# Patient Record
Sex: FEMALE | Race: WHITE | Employment: FULL TIME | URBAN - METROPOLITAN AREA
[De-identification: names, ages, dates, MRNs, and addresses within clinical notes are randomized per-mention and may not be internally consistent; named-entity substitution may affect disease eponyms.]

---

## 2018-02-16 ENCOUNTER — HOSPITAL ENCOUNTER (INPATIENT)
Facility: HOSPITAL | Age: 58
LOS: 1 days | Discharge: HOME | DRG: 310 | End: 2018-02-17
Attending: EMERGENCY MEDICINE | Admitting: HOSPITALIST
Payer: COMMERCIAL

## 2018-02-16 DIAGNOSIS — I48.91 ATRIAL FIBRILLATION AND FLUTTER (HCC): Primary | ICD-10-CM

## 2018-02-16 DIAGNOSIS — I48.92 ATRIAL FIBRILLATION AND FLUTTER (HCC): Primary | ICD-10-CM

## 2018-02-16 PROBLEM — E87.6 HYPOKALEMIA: Status: ACTIVE | Noted: 2018-02-16

## 2018-02-16 PROBLEM — R79.89 AZOTEMIA: Status: ACTIVE | Noted: 2018-02-16

## 2018-02-16 PROBLEM — R73.9 HYPERGLYCEMIA: Status: ACTIVE | Noted: 2018-02-16

## 2018-02-16 LAB
ANION GAP SERPL CALC-SCNC: 8 MMOL/L (ref 0–18)
APTT PPP: 24.7 SECONDS (ref 23.2–35.3)
BASOPHILS # BLD: 0.1 K/UL (ref 0–0.2)
BASOPHILS NFR BLD: 1 %
BILIRUB UR QL: NEGATIVE
BUN SERPL-MCNC: 15 MG/DL (ref 8–20)
BUN/CREAT SERPL: 20.5 (ref 10–20)
CALCIUM SERPL-MCNC: 9.2 MG/DL (ref 8.5–10.5)
CHLORIDE SERPL-SCNC: 103 MMOL/L (ref 95–110)
CO2 SERPL-SCNC: 27 MMOL/L (ref 22–32)
COLOR UR: YELLOW
CREAT SERPL-MCNC: 0.73 MG/DL (ref 0.5–1.5)
EOSINOPHIL # BLD: 0.2 K/UL (ref 0–0.7)
EOSINOPHIL NFR BLD: 3 %
ERYTHROCYTE [DISTWIDTH] IN BLOOD BY AUTOMATED COUNT: 14.4 % (ref 11–15)
GLUCOSE SERPL-MCNC: 111 MG/DL (ref 70–99)
GLUCOSE UR-MCNC: NEGATIVE MG/DL
HCT VFR BLD AUTO: 44.2 % (ref 35–48)
HGB BLD-MCNC: 14.9 G/DL (ref 12–16)
INR BLD: 0.9 (ref 0.9–1.2)
KETONES UR-MCNC: NEGATIVE MG/DL
LYMPHOCYTES # BLD: 1.7 K/UL (ref 1–4)
LYMPHOCYTES NFR BLD: 33 %
MAGNESIUM SERPL-MCNC: 1.9 MG/DL (ref 1.8–2.5)
MCH RBC QN AUTO: 30.2 PG (ref 27–32)
MCHC RBC AUTO-ENTMCNC: 33.8 G/DL (ref 32–37)
MCV RBC AUTO: 89.5 FL (ref 80–100)
MONOCYTES # BLD: 0.4 K/UL (ref 0–1)
MONOCYTES NFR BLD: 7 %
NEUTROPHILS # BLD AUTO: 3 K/UL (ref 1.8–7.7)
NEUTROPHILS NFR BLD: 56 %
NITRITE UR QL STRIP.AUTO: NEGATIVE
OSMOLALITY UR CALC.SUM OF ELEC: 288 MOSM/KG (ref 275–295)
PH UR: 5 [PH] (ref 5–8)
PLATELET # BLD AUTO: 250 K/UL (ref 140–400)
PMV BLD AUTO: 7.8 FL (ref 7.4–10.3)
POTASSIUM SERPL-SCNC: 3.1 MMOL/L (ref 3.3–5.1)
PROT UR-MCNC: NEGATIVE MG/DL
PROTHROMBIN TIME: 12.2 SECONDS (ref 11.8–14.5)
RBC # BLD AUTO: 4.94 M/UL (ref 3.7–5.4)
RBC #/AREA URNS AUTO: 7 /HPF
SODIUM SERPL-SCNC: 138 MMOL/L (ref 136–144)
SP GR UR STRIP: 1.02 (ref 1–1.03)
TROPONIN I SERPL-MCNC: 0 NG/ML (ref ?–0.03)
TSH SERPL-ACNC: 2.91 UIU/ML (ref 0.45–5.33)
UROBILINOGEN UR STRIP-ACNC: <2
VIT C UR-MCNC: NEGATIVE MG/DL
WBC # BLD AUTO: 5.3 K/UL (ref 4–11)
WBC #/AREA URNS AUTO: 32 /HPF

## 2018-02-16 PROCEDURE — 99222 1ST HOSP IP/OBS MODERATE 55: CPT | Performed by: HOSPITALIST

## 2018-02-16 RX ORDER — ACETAMINOPHEN / DIPHENHYDRAMINE 25; 500 MG/1; MG/1
1 TABLET ORAL NIGHTLY PRN
Status: ON HOLD | COMMUNITY
End: 2019-01-21

## 2018-02-16 RX ORDER — CIPROFLOXACIN 500 MG/1
500 TABLET, FILM COATED ORAL ONCE
Status: COMPLETED | OUTPATIENT
Start: 2018-02-16 | End: 2018-02-16

## 2018-02-16 RX ORDER — ACETAMINOPHEN / DIPHENHYDRAMINE 25; 500 MG/1; MG/1
1 TABLET ORAL NIGHTLY PRN
Status: DISCONTINUED | OUTPATIENT
Start: 2018-02-16 | End: 2018-02-16 | Stop reason: RX

## 2018-02-16 RX ORDER — LEVOFLOXACIN 5 MG/ML
750 INJECTION, SOLUTION INTRAVENOUS ONCE
Status: DISCONTINUED | OUTPATIENT
Start: 2018-02-16 | End: 2018-02-16

## 2018-02-16 RX ORDER — DILTIAZEM HYDROCHLORIDE 5 MG/ML
10 INJECTION INTRAVENOUS
Status: DISCONTINUED | OUTPATIENT
Start: 2018-02-16 | End: 2018-02-17

## 2018-02-16 RX ORDER — ASPIRIN 81 MG/1
81 TABLET ORAL NIGHTLY
Status: DISCONTINUED | OUTPATIENT
Start: 2018-02-16 | End: 2018-02-17

## 2018-02-16 RX ORDER — DIPHENHYDRAMINE HCL 25 MG
25 CAPSULE ORAL NIGHTLY PRN
Status: DISCONTINUED | OUTPATIENT
Start: 2018-02-16 | End: 2018-02-17

## 2018-02-16 RX ORDER — CIPROFLOXACIN 500 MG/1
750 TABLET, FILM COATED ORAL EVERY 12 HOURS SCHEDULED
Status: DISCONTINUED | OUTPATIENT
Start: 2018-02-17 | End: 2018-02-17

## 2018-02-16 RX ORDER — POTASSIUM CHLORIDE 20 MEQ/1
40 TABLET, EXTENDED RELEASE ORAL ONCE
Status: COMPLETED | OUTPATIENT
Start: 2018-02-16 | End: 2018-02-16

## 2018-02-16 RX ORDER — ZOLPIDEM TARTRATE 5 MG/1
5 TABLET ORAL NIGHTLY PRN
Status: DISCONTINUED | OUTPATIENT
Start: 2018-02-16 | End: 2018-02-16

## 2018-02-16 RX ORDER — ACETAMINOPHEN 500 MG
500 TABLET ORAL NIGHTLY PRN
Status: DISCONTINUED | OUTPATIENT
Start: 2018-02-16 | End: 2018-02-17

## 2018-02-16 RX ORDER — ASPIRIN 81 MG/1
81 TABLET ORAL NIGHTLY
Status: ON HOLD | COMMUNITY
End: 2019-01-21

## 2018-02-16 RX ORDER — DILTIAZEM HYDROCHLORIDE 60 MG/1
60 TABLET, FILM COATED ORAL AS NEEDED
Status: DISCONTINUED | OUTPATIENT
Start: 2018-02-16 | End: 2018-02-17

## 2018-02-16 RX ORDER — SODIUM CHLORIDE 0.9 % (FLUSH) 0.9 %
3 SYRINGE (ML) INJECTION AS NEEDED
Status: DISCONTINUED | OUTPATIENT
Start: 2018-02-16 | End: 2018-02-17

## 2018-02-16 RX ORDER — DILTIAZEM HYDROCHLORIDE 5 MG/ML
10 INJECTION INTRAVENOUS
Status: DISCONTINUED | OUTPATIENT
Start: 2018-02-16 | End: 2018-02-16 | Stop reason: ALTCHOICE

## 2018-02-16 RX ORDER — ONDANSETRON 2 MG/ML
4 INJECTION INTRAMUSCULAR; INTRAVENOUS EVERY 6 HOURS PRN
Status: DISCONTINUED | OUTPATIENT
Start: 2018-02-16 | End: 2018-02-17

## 2018-02-16 NOTE — ED NOTES
Care assumed from EMS. Patient presents with c/o \"fluttering\" feeling in her chest. Pt states she was sitting at work when she checked her heart rate via her watch and noticed it was \"going all over the place\". EMS reporting afib via EKG en route.  Ayanna

## 2018-02-16 NOTE — CONSULTS
[facility]    Report of Consultation    Kenroy Ling Patient Status:  Emergency    1960 MRN [id number]   Location [address] Attending Quinten Chambers MD   Hosp Day # 0 PCP No primary care provider on rates and was started on IV Cardizem. Patient transiently converted to sinus rhythm but is still in A. fib flutter. Patient states she is under a lot of stress.   She states she had meningitis almost 9 years ago and was treated and was given heparin for w pressure (!) 165/81, pulse 119, temperature 98.3 °F (36.8 °C), resp. rate 22, height 5' 5\" (1.651 m), weight 215 lb (97.5 kg), SpO2 96 %.   Intake/Output:           Last 24 hours: No intake or output data in the 24 hours ending 02/16/18 2482   This shift:

## 2018-02-16 NOTE — ED INITIAL ASSESSMENT (HPI)
Care assumed from EMS. Patient presents with palpitations, EMS reports new onset Afib. Denies chest pain, denies shortness of breath. Patient reports \"fluttering feeling\".

## 2018-02-16 NOTE — ED PROVIDER NOTES
Patient Seen in: [facility] AND [facility] Emergency Department    History   Patient presents with:  Arrythmia/Palpitations (cardiovascular)    Stated Complaint:     HPI    Patient presents emergency department complaining of fluttering in her chest.  She states Neurological: She is alert and oriented to person, place, and time. Skin: Skin is warm and dry. No rash noted. Nursing note and vitals reviewed.       116 bpm    ED Course     Labs Reviewed   BASIC METABOLIC PANEL (8) - Abnormal; Notable for the follo discussed with patient including need for follow up      Admission disposition: 2/16/2018  3:55 PM           Disposition and Plan     Clinical Impression:  Atrial fibrillation and flutter ([facility]  (primary encounter diagnosis)    Disposition:  Admit  2/16/20

## 2018-02-17 ENCOUNTER — APPOINTMENT (OUTPATIENT)
Dept: CV DIAGNOSTICS | Facility: HOSPITAL | Age: 58
DRG: 310 | End: 2018-02-17
Attending: INTERNAL MEDICINE
Payer: COMMERCIAL

## 2018-02-17 VITALS
OXYGEN SATURATION: 98 % | HEART RATE: 80 BPM | TEMPERATURE: 98 F | SYSTOLIC BLOOD PRESSURE: 133 MMHG | RESPIRATION RATE: 18 BRPM | WEIGHT: 231.19 LBS | HEIGHT: 65 IN | BODY MASS INDEX: 38.52 KG/M2 | DIASTOLIC BLOOD PRESSURE: 71 MMHG

## 2018-02-17 LAB
ANION GAP SERPL CALC-SCNC: 8 MMOL/L (ref 0–18)
BUN SERPL-MCNC: 11 MG/DL (ref 8–20)
BUN/CREAT SERPL: 15.5 (ref 10–20)
CALCIUM SERPL-MCNC: 9.4 MG/DL (ref 8.5–10.5)
CHLORIDE SERPL-SCNC: 104 MMOL/L (ref 95–110)
CO2 SERPL-SCNC: 28 MMOL/L (ref 22–32)
CREAT SERPL-MCNC: 0.71 MG/DL (ref 0.5–1.5)
GLUCOSE SERPL-MCNC: 110 MG/DL (ref 70–99)
MAGNESIUM SERPL-MCNC: 1.9 MG/DL (ref 1.8–2.5)
OSMOLALITY UR CALC.SUM OF ELEC: 290 MOSM/KG (ref 275–295)
POTASSIUM SERPL-SCNC: 4.1 MMOL/L (ref 3.3–5.1)
SODIUM SERPL-SCNC: 140 MMOL/L (ref 136–144)

## 2018-02-17 PROCEDURE — 99239 HOSP IP/OBS DSCHRG MGMT >30: CPT | Performed by: HOSPITALIST

## 2018-02-17 PROCEDURE — 93306 TTE W/DOPPLER COMPLETE: CPT | Performed by: INTERNAL MEDICINE

## 2018-02-17 RX ORDER — CIPROFLOXACIN 750 MG/1
750 TABLET, FILM COATED ORAL EVERY 12 HOURS SCHEDULED
Qty: 4 TABLET | Refills: 0 | Status: SHIPPED | OUTPATIENT
Start: 2018-02-17 | End: 2018-02-19

## 2018-02-17 NOTE — PLAN OF CARE
Problem: Patient/Family Goals  Goal: Patient/Family Short Term Goal  Patient's Short Term Goal: maintain regular heart rhythm    Interventions:   - diltiazem drip  - See additional Care Plan goals for specific interventions   Outcome: Progressing      Prob

## 2018-02-17 NOTE — PLAN OF CARE
Ok to discharge patient home. Discharge instructions explained to patient. Tele and IV discontinued. Per Lily PINEDA patient will follow up on Monday to have 30 day event monitor and they will make appointment for follow up.  Prescription for event monitor give

## 2018-02-17 NOTE — PROGRESS NOTES
[facility]    Progress Note    Davonte Krishnamurthy Patient Status:  Inpatient    1960 MRN [id number]   Location [facility] 3W/SW Attending Victorino Caicedo MD   Hosp Day # 1 PCP Yahir High MD       Subjective:   Boby Maldonado    Hypokalemia  -Correct potassium and check magnesium and TSH     Snoring  -Should have outpatient sleep study     Hypertension in the emergency room  -diltiazem      PLAN    echo normal per Dr Henrique Flores home per cardiology. 30 day event monitor.

## 2018-02-19 ENCOUNTER — MYAURORA ACCOUNT LINK (OUTPATIENT)
Dept: OTHER | Age: 58
End: 2018-02-19

## 2018-03-07 ENCOUNTER — PRIOR ORIGINAL RECORDS (OUTPATIENT)
Dept: OTHER | Age: 58
End: 2018-03-07

## 2018-03-08 ENCOUNTER — APPOINTMENT (OUTPATIENT)
Dept: GENERAL RADIOLOGY | Facility: HOSPITAL | Age: 58
End: 2018-03-08
Attending: PHYSICIAN ASSISTANT
Payer: COMMERCIAL

## 2018-03-08 ENCOUNTER — OFFICE VISIT (OUTPATIENT)
Dept: CARDIOLOGY CLINIC | Facility: CLINIC | Age: 58
End: 2018-03-08

## 2018-03-08 ENCOUNTER — HOSPITAL ENCOUNTER (EMERGENCY)
Facility: HOSPITAL | Age: 58
Discharge: HOME | End: 2018-03-08
Payer: COMMERCIAL

## 2018-03-08 ENCOUNTER — APPOINTMENT (OUTPATIENT)
Dept: LAB | Age: 58
End: 2018-03-08
Attending: INTERNAL MEDICINE
Payer: COMMERCIAL

## 2018-03-08 VITALS
DIASTOLIC BLOOD PRESSURE: 69 MMHG | TEMPERATURE: 98 F | OXYGEN SATURATION: 96 % | SYSTOLIC BLOOD PRESSURE: 124 MMHG | HEART RATE: 76 BPM | RESPIRATION RATE: 18 BRPM

## 2018-03-08 VITALS
SYSTOLIC BLOOD PRESSURE: 118 MMHG | WEIGHT: 230 LBS | DIASTOLIC BLOOD PRESSURE: 70 MMHG | HEART RATE: 64 BPM | RESPIRATION RATE: 12 BRPM | BODY MASS INDEX: 38 KG/M2

## 2018-03-08 DIAGNOSIS — E87.6 HYPOKALEMIA: ICD-10-CM

## 2018-03-08 DIAGNOSIS — I48.91 ATRIAL FIBRILLATION AND FLUTTER (HCC): Primary | ICD-10-CM

## 2018-03-08 DIAGNOSIS — S16.1XXA STRAIN OF NECK MUSCLE, INITIAL ENCOUNTER: ICD-10-CM

## 2018-03-08 DIAGNOSIS — I48.92 ATRIAL FIBRILLATION AND FLUTTER (HCC): ICD-10-CM

## 2018-03-08 DIAGNOSIS — I48.91 ATRIAL FIBRILLATION AND FLUTTER (HCC): ICD-10-CM
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
## 2018-03-08 DIAGNOSIS — I48.92 ATRIAL FIBRILLATION AND FLUTTER (HCC): Primary | ICD-10-CM

## 2018-03-08 DIAGNOSIS — S29.011A CHEST WALL MUSCLE STRAIN, INITIAL ENCOUNTER: ICD-10-CM

## 2018-03-08 LAB
ANION GAP SERPL CALC-SCNC: 8 MMOL/L (ref 0–18)
BUN SERPL-MCNC: 13 MG/DL (ref 8–20)
BUN/CREAT SERPL: 19.7 (ref 10–20)
CALCIUM SERPL-MCNC: 9.3 MG/DL (ref 8.5–10.5)
CHLORIDE SERPL-SCNC: 102 MMOL/L (ref 95–110)
CO2 SERPL-SCNC: 28 MMOL/L (ref 22–32)
CREAT SERPL-MCNC: 0.66 MG/DL (ref 0.5–1.5)
GLUCOSE SERPL-MCNC: 96 MG/DL (ref 70–99)
OSMOLALITY UR CALC.SUM OF ELEC: 286 MOSM/KG (ref 275–295)
POTASSIUM SERPL-SCNC: 3.8 MMOL/L (ref 3.3–5.1)
SODIUM SERPL-SCNC: 138 MMOL/L (ref 136–144)

## 2018-03-08 PROCEDURE — 1111F DSCHRG MED/CURRENT MED MERGE: CPT | Performed by: INTERNAL MEDICINE

## 2018-03-08 PROCEDURE — 72040 X-RAY EXAM NECK SPINE 2-3 VW: CPT | Performed by: PHYSICIAN ASSISTANT

## 2018-03-08 PROCEDURE — 73030 X-RAY EXAM OF SHOULDER: CPT | Performed by: PHYSICIAN ASSISTANT

## 2018-03-08 PROCEDURE — 99284 EMERGENCY DEPT VISIT MOD MDM: CPT

## 2018-03-08 PROCEDURE — 71101 X-RAY EXAM UNILAT RIBS/CHEST: CPT | Performed by: PHYSICIAN ASSISTANT

## 2018-03-08 PROCEDURE — 80048 BASIC METABOLIC PNL TOTAL CA: CPT

## 2018-03-08 PROCEDURE — 99212 OFFICE O/P EST SF 10 MIN: CPT | Performed by: INTERNAL MEDICINE

## 2018-03-08 PROCEDURE — 99214 OFFICE O/P EST MOD 30 MIN: CPT | Performed by: INTERNAL MEDICINE

## 2018-03-08 PROCEDURE — 36415 COLL VENOUS BLD VENIPUNCTURE: CPT

## 2018-03-08 PROCEDURE — 73060 X-RAY EXAM OF HUMERUS: CPT | Performed by: PHYSICIAN ASSISTANT
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
## 2018-03-08 NOTE — PATIENT INSTRUCTIONS
Blood test today  Call if recurrent atrial fibrillation that does not go away with rest or if symptomatic  make appointment to see Dr. Trixie Ramos

## 2018-03-08 NOTE — PROGRESS NOTES
Chari Queen is a 61year old female. Patient presents with: Follow - Up    HPI:   This pleasant 50-year-old with prior history of blood clot who was recently hospitalized for paroxysmal atrial fibrillation thought to be due to low potassium levels.   Carlyn Bark tenderness  EXTREMITIES: no cyanosis, clubbing or edema  PSYCH:alert and oriented x3    Assessment   ASSESSMENT AND PLAN:     Problem List Items Addressed This Visit     Atrial fibrillation and flutter ([address] - Primary    Relevant Orders    BASIC METABOLIC
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
## 2018-03-08 NOTE — ED NOTES
Patient comfortable w/ DC plan- will FU w/ PCP as instructed. Ice pack given for home use and patient understands to ice areas and take OTC pain medication as needed for discomfort. She understands to return for new or worsening symptoms.  She denies need f
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
## 2018-03-28 ENCOUNTER — PRIOR ORIGINAL RECORDS (OUTPATIENT)
Dept: OTHER | Age: 58
End: 2018-03-28

## 2018-04-05 ENCOUNTER — OFFICE VISIT (OUTPATIENT)
Dept: CARDIOLOGY CLINIC | Facility: CLINIC | Age: 58
End: 2018-04-05

## 2018-04-05 VITALS
HEART RATE: 88 BPM | DIASTOLIC BLOOD PRESSURE: 68 MMHG | SYSTOLIC BLOOD PRESSURE: 120 MMHG | BODY MASS INDEX: 39 KG/M2 | WEIGHT: 234 LBS | RESPIRATION RATE: 16 BRPM

## 2018-04-05 DIAGNOSIS — I48.0 PAROXYSMAL ATRIAL FIBRILLATION (HCC): Primary | ICD-10-CM

## 2018-04-05 DIAGNOSIS — E66.09 CLASS 2 OBESITY DUE TO EXCESS CALORIES WITH BODY MASS INDEX (BMI) OF 38.0 TO 38.9 IN ADULT, UNSPECIFIED WHETHER SERIOUS COMORBIDITY PRESENT: ICD-10-CM

## 2018-04-05 PROBLEM — E87.6 HYPOKALEMIA: Status: RESOLVED | Noted: 2018-02-16 | Resolved: 2018-04-05

## 2018-04-05 PROCEDURE — 99245 OFF/OP CONSLTJ NEW/EST HI 55: CPT | Performed by: INTERNAL MEDICINE

## 2018-04-05 PROCEDURE — 99212 OFFICE O/P EST SF 10 MIN: CPT | Performed by: INTERNAL MEDICINE

## 2018-04-05 NOTE — PATIENT INSTRUCTIONS
- lifestyle modifications as discussed  - talk to your PCP about getting a sleep study ordered  - continue aspirin  - see Dr Sumanth Colon in 6 months, or sooner if symptoms increase or other concerns arise

## 2018-04-05 NOTE — H&P
[facility]    Cardiac Electrophysiology Consultation    Name:  Antwan Griggs  : 1960    Date of consultation:   2018    Referring physician: Dr. Severa Res    Reason for Consultation:  Atrial fibrillation    History of Present Illness:  Radha Soriano STRENGTH)  MG Oral Tab Take 1 tablet by mouth nightly as needed. Disp:  Rfl:      No current facility-administered medications on file prior to visit.      Review of Systems:  GENERAL: no fevers, chills, sweats  HEENT: no visual or hearing changes  SK Chloride Latest Ref Range: 95 - 110 mmol/L 102   Carbon Dioxide, Total Latest Ref Range: 22 - 32 mmol/L 28   BUN Latest Ref Range: 8 - 20 mg/dL 13   CREATININE Latest Ref Range: 0.50 - 1.50 mg/dL 0.66   CALCIUM Latest Ref Range: 8.5 - 10.5 mg/dL 9.3   BU

## 2018-04-09 NOTE — DISCHARGE SUMMARY
[facility] HOSPITALIST  DISCHARGE SUMMARY     Jimmy Floyd Patient Status:  Inpatient    1960 MRN [id number]   Location [facility] 3W/SW Attending No att. providers found   [facility] Day # 1 PCP Monie Joshi MD     Date of Admission: 2018 today.  While at work, she started feeling her chest fluttering. Upon presentation to the emergency room, she was found to have atrial fibrillation with rapid ventricular response with rates around 140s. She never had similar problems before.   She denied Tbec      Take 81 mg by mouth nightly. Refills:  0     TYLENOL PM EXTRA STRENGTH  MG Tabs  Generic drug:  Diphenhydramine-APAP (sleep)      Take 1 tablet by mouth nightly as needed.    Refills:  0        ASK your doctor about these medications

## 2019-01-04 ENCOUNTER — TELEPHONE (OUTPATIENT)
Dept: OBGYN CLINIC | Facility: CLINIC | Age: 59
End: 2019-01-04

## 2019-01-07 ENCOUNTER — OFFICE VISIT (OUTPATIENT)
Dept: OBGYN CLINIC | Facility: CLINIC | Age: 59
End: 2019-01-07
Payer: COMMERCIAL

## 2019-01-07 VITALS — DIASTOLIC BLOOD PRESSURE: 73 MMHG | WEIGHT: 231 LBS | SYSTOLIC BLOOD PRESSURE: 125 MMHG | BODY MASS INDEX: 38 KG/M2

## 2019-01-07 DIAGNOSIS — Z12.4 SCREENING FOR MALIGNANT NEOPLASM OF CERVIX: ICD-10-CM

## 2019-01-07 DIAGNOSIS — R19.00 PELVIC MASS IN FEMALE: Primary | ICD-10-CM

## 2019-01-07 DIAGNOSIS — Z01.411 ENCOUNTER FOR GYNECOLOGICAL EXAMINATION WITH ABNORMAL FINDING: ICD-10-CM

## 2019-01-07 DIAGNOSIS — Z12.31 SCREENING MAMMOGRAM, ENCOUNTER FOR: ICD-10-CM

## 2019-01-07 PROCEDURE — 99213 OFFICE O/P EST LOW 20 MIN: CPT | Performed by: OBSTETRICS & GYNECOLOGY

## 2019-01-07 PROCEDURE — 99386 PREV VISIT NEW AGE 40-64: CPT | Performed by: OBSTETRICS & GYNECOLOGY

## 2019-01-08 LAB — HPV I/H RISK 1 DNA SPEC QL NAA+PROBE: NEGATIVE

## 2019-01-09 ENCOUNTER — APPOINTMENT (OUTPATIENT)
Dept: LAB | Age: 59
End: 2019-01-09
Attending: OBSTETRICS & GYNECOLOGY
Payer: COMMERCIAL

## 2019-01-09 ENCOUNTER — HOSPITAL ENCOUNTER (OUTPATIENT)
Dept: ULTRASOUND IMAGING | Age: 59
Discharge: HOME | End: 2019-01-09
Attending: OBSTETRICS & GYNECOLOGY
Payer: COMMERCIAL

## 2019-01-09 ENCOUNTER — TELEPHONE (OUTPATIENT)
Dept: OBGYN CLINIC | Facility: CLINIC | Age: 59
End: 2019-01-09

## 2019-01-09 DIAGNOSIS — R19.00 PELVIC MASS IN FEMALE: ICD-10-CM

## 2019-01-09 DIAGNOSIS — R19.00 PELVIC MASS: Primary | ICD-10-CM

## 2019-01-09 LAB
CANCER AG125 SERPL-ACNC: 249.1 U/ML (ref ?–35)
CANCER AG125 SERPL-ACNC: 83 U/ML (ref 0–35)

## 2019-01-09 PROCEDURE — 86304 IMMUNOASSAY TUMOR CA 125: CPT

## 2019-01-09 PROCEDURE — 76856 US EXAM PELVIC COMPLETE: CPT | Performed by: OBSTETRICS & GYNECOLOGY

## 2019-01-09 PROCEDURE — 36415 COLL VENOUS BLD VENIPUNCTURE: CPT

## 2019-01-09 PROCEDURE — 76830 TRANSVAGINAL US NON-OB: CPT | Performed by: OBSTETRICS & GYNECOLOGY

## 2019-01-10 NOTE — PROGRESS NOTES
HPI:    Patient ID: Reji Rowe is a 60year old female. Referred by fercho- Chet Bhagat    HPI  Nulligravida and LMP at age 54. Her hx starts just prior to Thanksgiving 2018. She had RLQ pain with blood tinges urine both of which resolved in 2-3 days.  Her Neck: Neck supple. No thyromegaly present. Cardiovascular: Normal rate, regular rhythm and normal heart sounds. No murmur heard. Pulmonary/Chest: Effort normal and breath sounds normal. She has no wheezes. She has no rales.    Bilateral breasts witho , Thin Prep Collect      ThinPrep PAP Smear      THINPREP PAP SMEAR ONLY      Meds This Visit:  Requested Prescriptions      No prescriptions requested or ordered in this encounter       Imaging & Referrals:  [facility] 2D+3D SCREENING BILAT (CPT=[id number]

## 2019-01-10 NOTE — TELEPHONE ENCOUNTER
I spoke with Lukas Caro and discussed US and Ca-125 results. We discussed need for surgery with laparotomy with JESUS-BSO and possible staging with Gyne Onc depending on initial operative findings and possible frozen section.  We'll do case with either Dr Bishop Plan

## 2019-01-10 NOTE — TELEPHONE ENCOUNTER
Spoke to tony with Whitney's office and she stated she will have a better ideal tomorrow and will follow up with me at that time. Spoke with patient regarding status of scheduling.

## 2019-01-11 ENCOUNTER — TELEPHONE (OUTPATIENT)
Dept: OBGYN CLINIC | Facility: CLINIC | Age: 59
End: 2019-01-11

## 2019-01-11 NOTE — TELEPHONE ENCOUNTER
Patient is scheduled 1/18/19 1pm Laparotomy, JESUS/BSO poss staging AVA/Liotta. Pat orders. Instructions routed via my chart. Patient informed.

## 2019-01-11 NOTE — TELEPHONE ENCOUNTER
Patient noted she wanted to speak to AVA regarding pre op testing and other questions she wants to address.

## 2019-01-11 NOTE — TELEPHONE ENCOUNTER
Pt calling to report that the last time she saw AVA, he wanted her to get the name of her Hematologist so that AVA could reach out to him before pts surgery next Friday. Pt stated that she has a hx of a pulmonary embolism about 10 years ago. Pt stated the name of that doctor is Dr. Inga Gomez and he is out of [facility][1] in [facility]. Pt stated she last saw this doctor in 8/2009 Pt stated Dr. Lucero Harris phone number is [phone]. Pt informed that message will be relayed to AVA. Message sent to AVA via Epic and also placed on his desk for review. Pt also stated that AVA asked pt last time she was seen if pt has ever been a diet supplement and at the time pt stated no. Pt stated that she realized her protein shakes that she has been drinking every day for the past 2 years have a dietary supplement in it and wanted Ava to know.

## 2019-01-15 PROBLEM — Z86.711 HISTORY OF PULMONARY EMBOLISM: Status: ACTIVE | Noted: 2019-01-15

## 2019-01-15 NOTE — TELEPHONE ENCOUNTER
PT HAS NOT BEEN CONTACTED BY DAVIDE AND SHE ALSO WANTED TO KNOW IF AVA SPOKE WITH HER HEMATOLOGIST? PER YISSEL, SURGERY HAS JUST BEEN FINALIZED SO DAVIDE WILL BE CALLING PT THIS AFTERNOON. PT AWARE WE WILL NOTIFY AVA TO BE SURE HE SPEAKS WITH HER HEMATOLOGIST.

## 2019-01-15 NOTE — TELEPHONE ENCOUNTER
Pt requesting to speak with nurse, wants to conform that AVA has received the information regarding Dr Kiersten Gutierrez.  Pt would also like to discuss surgery

## 2019-01-16 NOTE — TELEPHONE ENCOUNTER
I spoke with Eric Garcia concerning records from Dr Carole Pimentel office. 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 She did have initial single factor + in her thrombophilia w/u but then retested after 6 months of Coumadin and negative. I have contacted Dr Jennifer Chavez concerning this.

## 2019-01-17 ENCOUNTER — LAB ENCOUNTER (OUTPATIENT)
Dept: LAB | Age: 59
End: 2019-01-17
Attending: OBSTETRICS & GYNECOLOGY
Payer: COMMERCIAL

## 2019-01-17 ENCOUNTER — APPOINTMENT (OUTPATIENT)
Dept: LAB | Age: 59
End: 2019-01-17
Attending: OBSTETRICS & GYNECOLOGY
Payer: COMMERCIAL

## 2019-01-17 DIAGNOSIS — R19.00 PELVIC MASS IN FEMALE: ICD-10-CM

## 2019-01-17 DIAGNOSIS — I48.0 PAROXYSMAL ATRIAL FIBRILLATION (HCC): ICD-10-CM

## 2019-01-17 LAB
ALBUMIN SERPL BCP-MCNC: 3.9 G/DL (ref 3.5–4.8)
ALBUMIN/GLOB SERPL: 1.3 {RATIO} (ref 1–2)
ALP SERPL-CCNC: 85 U/L (ref 32–100)
ALT SERPL-CCNC: 22 U/L (ref 14–54)
ANION GAP SERPL CALC-SCNC: 12 MMOL/L (ref 0–18)
ANTIBODY SCREEN: NEGATIVE
AST SERPL-CCNC: 21 U/L (ref 15–41)
BASOPHILS # BLD: 0.1 K/UL (ref 0–0.2)
BASOPHILS NFR BLD: 1 %
BILIRUB SERPL-MCNC: 0.9 MG/DL (ref 0.3–1.2)
BUN SERPL-MCNC: 14 MG/DL (ref 8–20)
BUN/CREAT SERPL: 20.6 (ref 10–20)
CALCIUM SERPL-MCNC: 9.3 MG/DL (ref 8.5–10.5)
CHLORIDE SERPL-SCNC: 102 MMOL/L (ref 95–110)
CO2 SERPL-SCNC: 24 MMOL/L (ref 22–32)
CREAT SERPL-MCNC: 0.68 MG/DL (ref 0.5–1.5)
EOSINOPHIL # BLD: 0.1 K/UL (ref 0–0.7)
EOSINOPHIL NFR BLD: 2 %
ERYTHROCYTE [DISTWIDTH] IN BLOOD BY AUTOMATED COUNT: 13.8 % (ref 11–15)
GLOBULIN PLAS-MCNC: 3.1 G/DL (ref 2.5–3.7)
GLUCOSE SERPL-MCNC: 94 MG/DL (ref 70–99)
HCT VFR BLD AUTO: 42.2 % (ref 35–48)
HGB BLD-MCNC: 14.4 G/DL (ref 12–16)
LYMPHOCYTES # BLD: 1.3 K/UL (ref 1–4)
LYMPHOCYTES NFR BLD: 25 %
MCH RBC QN AUTO: 30.1 PG (ref 27–32)
MCHC RBC AUTO-ENTMCNC: 34 G/DL (ref 32–37)
MCV RBC AUTO: 88.4 FL (ref 80–100)
MONOCYTES # BLD: 0.4 K/UL (ref 0–1)
MONOCYTES NFR BLD: 8 %
NEUTROPHILS # BLD AUTO: 3.2 K/UL (ref 1.8–7.7)
NEUTROPHILS NFR BLD: 63 %
OSMOLALITY UR CALC.SUM OF ELEC: 286 MOSM/KG (ref 275–295)
PATIENT FASTING: NO
PLATELET # BLD AUTO: 259 K/UL (ref 140–400)
PMV BLD AUTO: 8 FL (ref 7.4–10.3)
POTASSIUM SERPL-SCNC: 4 MMOL/L (ref 3.3–5.1)
PROT SERPL-MCNC: 7 G/DL (ref 5.9–8.4)
RBC # BLD AUTO: 4.77 M/UL (ref 3.7–5.4)
RH BLOOD TYPE: POSITIVE
SODIUM SERPL-SCNC: 138 MMOL/L (ref 136–144)
WBC # BLD AUTO: 5.1 K/UL (ref 4–11)

## 2019-01-17 PROCEDURE — 85025 COMPLETE CBC W/AUTO DIFF WBC: CPT

## 2019-01-17 PROCEDURE — 36415 COLL VENOUS BLD VENIPUNCTURE: CPT

## 2019-01-17 PROCEDURE — 93010 ELECTROCARDIOGRAM REPORT: CPT | Performed by: OBSTETRICS & GYNECOLOGY

## 2019-01-17 PROCEDURE — 93005 ELECTROCARDIOGRAM TRACING: CPT

## 2019-01-17 PROCEDURE — 86900 BLOOD TYPING SEROLOGIC ABO: CPT

## 2019-01-17 PROCEDURE — 80053 COMPREHEN METABOLIC PANEL: CPT

## 2019-01-17 PROCEDURE — 86850 RBC ANTIBODY SCREEN: CPT

## 2019-01-17 PROCEDURE — 86901 BLOOD TYPING SEROLOGIC RH(D): CPT

## 2019-01-18 ENCOUNTER — HOSPITAL ENCOUNTER (INPATIENT)
Facility: HOSPITAL | Age: 59
LOS: 4 days | Discharge: HOME | DRG: 743 | End: 2019-01-22
Attending: OBSTETRICS & GYNECOLOGY | Admitting: OBSTETRICS & GYNECOLOGY
Payer: COMMERCIAL

## 2019-01-18 ENCOUNTER — ANESTHESIA (OUTPATIENT)
Dept: SURGERY | Facility: HOSPITAL | Age: 59
End: 2019-01-18

## 2019-01-18 ENCOUNTER — ANESTHESIA EVENT (OUTPATIENT)
Dept: SURGERY | Facility: HOSPITAL | Age: 59
End: 2019-01-18

## 2019-01-18 DIAGNOSIS — R19.00 PELVIC MASS: ICD-10-CM

## 2019-01-18 DIAGNOSIS — R19.00 PELVIC MASS IN FEMALE: ICD-10-CM

## 2019-01-18 DIAGNOSIS — I48.0 PAROXYSMAL ATRIAL FIBRILLATION (HCC): Primary | ICD-10-CM

## 2019-01-18 PROBLEM — Z90.710 STATUS POST HYSTERECTOMY: Status: ACTIVE | Noted: 2019-01-18

## 2019-01-18 LAB
APTT PPP: 26.2 SECONDS (ref 23.2–35.3)
INR BLD: 1.1 (ref 0.9–1.2)
PROTHROMBIN TIME: 14 SECONDS (ref 11.8–14.5)

## 2019-01-18 PROCEDURE — 07BC0ZX EXCISION OF PELVIS LYMPHATIC, OPEN APPROACH, DIAGNOSTIC: ICD-10-PCS | Performed by: OBSTETRICS & GYNECOLOGY

## 2019-01-18 PROCEDURE — 0UT70ZZ RESECTION OF BILATERAL FALLOPIAN TUBES, OPEN APPROACH: ICD-10-PCS | Performed by: OBSTETRICS & GYNECOLOGY

## 2019-01-18 PROCEDURE — 0UT90ZZ RESECTION OF UTERUS, OPEN APPROACH: ICD-10-PCS | Performed by: OBSTETRICS & GYNECOLOGY

## 2019-01-18 PROCEDURE — 07BD0ZZ EXCISION OF AORTIC LYMPHATIC, OPEN APPROACH: ICD-10-PCS | Performed by: OBSTETRICS & GYNECOLOGY

## 2019-01-18 PROCEDURE — 0DBU0ZZ EXCISION OF OMENTUM, OPEN APPROACH: ICD-10-PCS | Performed by: OBSTETRICS & GYNECOLOGY

## 2019-01-18 PROCEDURE — 0UT20ZZ RESECTION OF BILATERAL OVARIES, OPEN APPROACH: ICD-10-PCS | Performed by: OBSTETRICS & GYNECOLOGY

## 2019-01-18 RX ORDER — POLYETHYLENE GLYCOL 3350 17 G/17G
17 POWDER, FOR SOLUTION ORAL DAILY PRN
Status: DISCONTINUED | OUTPATIENT
Start: 2019-01-18 | End: 2019-01-22

## 2019-01-18 RX ORDER — ONDANSETRON 2 MG/ML
INJECTION INTRAMUSCULAR; INTRAVENOUS AS NEEDED
Status: DISCONTINUED | OUTPATIENT
Start: 2019-01-18 | End: 2019-01-18 | Stop reason: SURG

## 2019-01-18 RX ORDER — MORPHINE SULFATE 4 MG/ML
4 INJECTION, SOLUTION INTRAMUSCULAR; INTRAVENOUS EVERY 5 MIN PRN
Status: DISCONTINUED | OUTPATIENT
Start: 2019-01-18 | End: 2019-01-18 | Stop reason: HOSPADM

## 2019-01-18 RX ORDER — ONDANSETRON 2 MG/ML
4 INJECTION INTRAMUSCULAR; INTRAVENOUS ONCE AS NEEDED
Status: COMPLETED | OUTPATIENT
Start: 2019-01-18 | End: 2019-01-18

## 2019-01-18 RX ORDER — FONDAPARINUX SODIUM 2.5 MG/.5ML
2.5 INJECTION SUBCUTANEOUS DAILY
Status: DISCONTINUED | OUTPATIENT
Start: 2019-01-18 | End: 2019-01-22

## 2019-01-18 RX ORDER — MIDAZOLAM HYDROCHLORIDE 1 MG/ML
INJECTION INTRAMUSCULAR; INTRAVENOUS AS NEEDED
Status: DISCONTINUED | OUTPATIENT
Start: 2019-01-18 | End: 2019-01-18 | Stop reason: SURG

## 2019-01-18 RX ORDER — SODIUM CHLORIDE 0.9 % (FLUSH) 0.9 %
10 SYRINGE (ML) INJECTION AS NEEDED
Status: DISCONTINUED | OUTPATIENT
Start: 2019-01-18 | End: 2019-01-22

## 2019-01-18 RX ORDER — CLINDAMYCIN PHOSPHATE 900 MG/50ML
900 INJECTION INTRAVENOUS ONCE
Status: DISCONTINUED | OUTPATIENT
Start: 2019-01-18 | End: 2019-01-18 | Stop reason: HOSPADM

## 2019-01-18 RX ORDER — KETOROLAC TROMETHAMINE 30 MG/ML
30 INJECTION, SOLUTION INTRAMUSCULAR; INTRAVENOUS EVERY 6 HOURS
Status: DISCONTINUED | OUTPATIENT
Start: 2019-01-18 | End: 2019-01-20

## 2019-01-18 RX ORDER — NEOSTIGMINE METHYLSULFATE 0.5 MG/ML
INJECTION INTRAVENOUS AS NEEDED
Status: DISCONTINUED | OUTPATIENT
Start: 2019-01-18 | End: 2019-01-18 | Stop reason: SURG

## 2019-01-18 RX ORDER — DEXAMETHASONE SODIUM PHOSPHATE 4 MG/ML
VIAL (ML) INJECTION AS NEEDED
Status: DISCONTINUED | OUTPATIENT
Start: 2019-01-18 | End: 2019-01-18 | Stop reason: SURG

## 2019-01-18 RX ORDER — NALOXONE HYDROCHLORIDE 0.4 MG/ML
80 INJECTION, SOLUTION INTRAMUSCULAR; INTRAVENOUS; SUBCUTANEOUS AS NEEDED
Status: DISCONTINUED | OUTPATIENT
Start: 2019-01-18 | End: 2019-01-18 | Stop reason: HOSPADM

## 2019-01-18 RX ORDER — ONDANSETRON 2 MG/ML
4 INJECTION INTRAMUSCULAR; INTRAVENOUS EVERY 8 HOURS PRN
Status: DISCONTINUED | OUTPATIENT
Start: 2019-01-18 | End: 2019-01-22

## 2019-01-18 RX ORDER — NALOXONE HYDROCHLORIDE 0.4 MG/ML
0.08 INJECTION, SOLUTION INTRAMUSCULAR; INTRAVENOUS; SUBCUTANEOUS
Status: DISCONTINUED | OUTPATIENT
Start: 2019-01-18 | End: 2019-01-22

## 2019-01-18 RX ORDER — SODIUM CHLORIDE, SODIUM LACTATE, POTASSIUM CHLORIDE, CALCIUM CHLORIDE 600; 310; 30; 20 MG/100ML; MG/100ML; MG/100ML; MG/100ML
INJECTION, SOLUTION INTRAVENOUS CONTINUOUS
Status: DISCONTINUED | OUTPATIENT
Start: 2019-01-18 | End: 2019-01-18 | Stop reason: HOSPADM

## 2019-01-18 RX ORDER — GLYCOPYRROLATE 0.2 MG/ML
INJECTION INTRAMUSCULAR; INTRAVENOUS AS NEEDED
Status: DISCONTINUED | OUTPATIENT
Start: 2019-01-18 | End: 2019-01-18 | Stop reason: SURG

## 2019-01-18 RX ORDER — CLINDAMYCIN PHOSPHATE 150 MG/ML
INJECTION, SOLUTION INTRAVENOUS AS NEEDED
Status: DISCONTINUED | OUTPATIENT
Start: 2019-01-18 | End: 2019-01-18 | Stop reason: SURG

## 2019-01-18 RX ORDER — SODIUM CHLORIDE, SODIUM LACTATE, POTASSIUM CHLORIDE, CALCIUM CHLORIDE 600; 310; 30; 20 MG/100ML; MG/100ML; MG/100ML; MG/100ML
INJECTION, SOLUTION INTRAVENOUS CONTINUOUS
Status: DISCONTINUED | OUTPATIENT
Start: 2019-01-18 | End: 2019-01-18

## 2019-01-18 RX ORDER — FAMOTIDINE 20 MG/1
20 TABLET ORAL ONCE
Status: COMPLETED | OUTPATIENT
Start: 2019-01-18 | End: 2019-01-18

## 2019-01-18 RX ORDER — DOCUSATE SODIUM 100 MG/1
100 CAPSULE, LIQUID FILLED ORAL DAILY
Status: DISCONTINUED | OUTPATIENT
Start: 2019-01-18 | End: 2019-01-22

## 2019-01-18 RX ORDER — ROCURONIUM BROMIDE 10 MG/ML
INJECTION, SOLUTION INTRAVENOUS AS NEEDED
Status: DISCONTINUED | OUTPATIENT
Start: 2019-01-18 | End: 2019-01-18 | Stop reason: SURG

## 2019-01-18 RX ORDER — ONDANSETRON 4 MG/1
4 TABLET, FILM COATED ORAL EVERY 8 HOURS PRN
Status: DISCONTINUED | OUTPATIENT
Start: 2019-01-18 | End: 2019-01-22

## 2019-01-18 RX ORDER — DEXTROSE, SODIUM CHLORIDE, SODIUM LACTATE, POTASSIUM CHLORIDE, AND CALCIUM CHLORIDE 5; .6; .31; .03; .02 G/100ML; G/100ML; G/100ML; G/100ML; G/100ML
INJECTION, SOLUTION INTRAVENOUS CONTINUOUS
Status: DISCONTINUED | OUTPATIENT
Start: 2019-01-18 | End: 2019-01-22

## 2019-01-18 RX ORDER — SODIUM PHOSPHATE, DIBASIC AND SODIUM PHOSPHATE, MONOBASIC 7; 19 G/133ML; G/133ML
1 ENEMA RECTAL ONCE AS NEEDED
Status: DISCONTINUED | OUTPATIENT
Start: 2019-01-18 | End: 2019-01-22

## 2019-01-18 RX ORDER — NALBUPHINE HCL 10 MG/ML
2.5 AMPUL (ML) INJECTION EVERY 4 HOURS PRN
Status: DISCONTINUED | OUTPATIENT
Start: 2019-01-18 | End: 2019-01-22

## 2019-01-18 RX ORDER — ACETAMINOPHEN 500 MG
1000 TABLET ORAL ONCE
Status: COMPLETED | OUTPATIENT
Start: 2019-01-18 | End: 2019-01-18

## 2019-01-18 RX ORDER — DIPHENHYDRAMINE HYDROCHLORIDE 50 MG/ML
12.5 INJECTION INTRAMUSCULAR; INTRAVENOUS EVERY 4 HOURS PRN
Status: DISCONTINUED | OUTPATIENT
Start: 2019-01-18 | End: 2019-01-22

## 2019-01-18 RX ORDER — METOCLOPRAMIDE 10 MG/1
10 TABLET ORAL ONCE
Status: COMPLETED | OUTPATIENT
Start: 2019-01-18 | End: 2019-01-18

## 2019-01-18 RX ORDER — BISACODYL 10 MG
10 SUPPOSITORY, RECTAL RECTAL
Status: DISCONTINUED | OUTPATIENT
Start: 2019-01-18 | End: 2019-01-22

## 2019-01-18 RX ORDER — ONDANSETRON 2 MG/ML
4 INJECTION INTRAMUSCULAR; INTRAVENOUS EVERY 6 HOURS PRN
Status: DISCONTINUED | OUTPATIENT
Start: 2019-01-18 | End: 2019-01-22

## 2019-01-18 RX ADMIN — NEOSTIGMINE METHYLSULFATE 4 MG: 0.5 INJECTION INTRAVENOUS at 16:10:00

## 2019-01-18 RX ADMIN — DEXAMETHASONE SODIUM PHOSPHATE 8 MG: 4 MG/ML VIAL (ML) INJECTION at 14:14:00

## 2019-01-18 RX ADMIN — CLINDAMYCIN PHOSPHATE 900 MG: 150 INJECTION, SOLUTION INTRAVENOUS at 14:10:00

## 2019-01-18 RX ADMIN — SODIUM CHLORIDE, SODIUM LACTATE, POTASSIUM CHLORIDE, CALCIUM CHLORIDE: 600; 310; 30; 20 INJECTION, SOLUTION INTRAVENOUS at 13:46:00

## 2019-01-18 RX ADMIN — GLYCOPYRROLATE 0.4 MG: 0.2 INJECTION INTRAMUSCULAR; INTRAVENOUS at 16:10:00

## 2019-01-18 RX ADMIN — SODIUM CHLORIDE, SODIUM LACTATE, POTASSIUM CHLORIDE, CALCIUM CHLORIDE: 600; 310; 30; 20 INJECTION, SOLUTION INTRAVENOUS at 16:16:00

## 2019-01-18 RX ADMIN — ROCURONIUM BROMIDE 20 MG: 10 INJECTION, SOLUTION INTRAVENOUS at 14:51:00

## 2019-01-18 RX ADMIN — ROCURONIUM BROMIDE 20 MG: 10 INJECTION, SOLUTION INTRAVENOUS at 15:34:00

## 2019-01-18 RX ADMIN — SODIUM CHLORIDE, SODIUM LACTATE, POTASSIUM CHLORIDE, CALCIUM CHLORIDE: 600; 310; 30; 20 INJECTION, SOLUTION INTRAVENOUS at 15:50:00

## 2019-01-18 RX ADMIN — MIDAZOLAM HYDROCHLORIDE 2 MG: 1 INJECTION INTRAMUSCULAR; INTRAVENOUS at 13:46:00

## 2019-01-18 RX ADMIN — ROCURONIUM BROMIDE 40 MG: 10 INJECTION, SOLUTION INTRAVENOUS at 13:48:00

## 2019-01-18 RX ADMIN — ROCURONIUM BROMIDE 10 MG: 10 INJECTION, SOLUTION INTRAVENOUS at 14:39:00

## 2019-01-18 RX ADMIN — ONDANSETRON 4 MG: 2 INJECTION INTRAMUSCULAR; INTRAVENOUS at 16:00:00

## 2019-01-18 NOTE — BRIEF OP NOTE
Pre-Operative Diagnosis: Pelvic mass [R19.00]     Post-Operative Diagnosis: Pelvic mass [R19.00]      Procedure Performed:   Procedure(s):  Laparotomy with Total Abdominal Hysterectomy-Bilateral Salpingo Oophorectomy and staging procedure    Co-Surgeon(s)

## 2019-01-18 NOTE — ANESTHESIA POSTPROCEDURE EVALUATION
Patient: Keenan Pedraza    Procedure Summary     Date:  01/18/19 Room / Location:  [address] [place] [address] MAIN OR 06 / [address] [place] [address] MAIN OR    Anesthesia Start:  5359 Anesthesia Stop:  8684    Procedure:  ABDOMINAL HYSTERECTOMY TOTAL BSO STAGING (Bilateral ) Diagnosis:       Pelvic

## 2019-01-18 NOTE — ANESTHESIA PROCEDURE NOTES
Peripheral IV  Date/Time: 1/18/2019 2:00 PM  Inserted by: Jasmin Barillas MD    Placement  Needle size: 20 G  Laterality: right  Location: forearm  Local anesthetic: none  Site prep: alcohol  Technique: anatomical landmarks  Attempts: 1

## 2019-01-18 NOTE — INTERVAL H&P NOTE
Pre-op Diagnosis: Pelvic mass [R19.00]    The above referenced H&P was reviewed by Arpan Blankenship. DO Kelvin on 1/18/2019, the patient was examined and no significant changes have occurred in the patient's condition since the H&P was performed.   I discussed wit

## 2019-01-18 NOTE — H&P
[facility]    History and Physical    Davonte Krishnamurthy Patient Status:  Surgery Admit    1960 MRN [id number]   [place] [address] Attending Terr[address]  Day # 0 PCP Yahir High MD     Date:   arrest    • Cancer Father         prostate   • Diabetes Mother    • Other (TIA) Mother 48        multiple    • Diabetes Sister         Type I   • Diabetes Sister         Type II   • Other (Stroke) Sister 28        r/t birth control pills      Social Histor breath sounds normal. She has no wheezes. She has no rales. Bilateral breasts without skin / nipple changes, mass, tenderness or axillary adenopathy. Abdominal: Soft. She exhibits mass. She exhibits no distension. There is no tenderness.  There is no leads. ABNORMAL When compared with ECG of 02/16/2018 14:28:33 afib has resolved, now sinus Electronically signed on 01/17/2019 at 16:49 by Dirk Shaikh MD      Assessment/Plan:     Pelvic mass in female  We discussed indication for laparotomy with JESUS-BSO

## 2019-01-18 NOTE — ANESTHESIA PROCEDURE NOTES
ANESTHESIA INTUBATION  Date/Time: 1/18/2019 1:57 PM  Urgency: elective    Difficult airway    General Information and Staff    Patient location during procedure: OR  Anesthesiologist: Eula Hernandez MD  Performed: anesthesiologist     Indications and Patie

## 2019-01-18 NOTE — ANESTHESIA PREPROCEDURE EVALUATION
Anesthesia PreOp Note    HPI:     Yessenia Newman is a 60year old female who presents for preoperative consultation requested by: Andrea Madrid DO    Date of Surgery: 1/18/2019    Procedure(s):  ABDOMINAL HYSTERECTOMY TOTAL BSO STAGING  Indication: P Epic:  lactated ringers infusion  Intravenous Continuous Yonis Warren DO   lactated ringers infusion  Intravenous Continuous Yonis Warren DO   clindamycin (CLEOCIN) in D5W 900mg/50ml premix 900 mg Intravenous Once Yonis Warren DO   And a defibrillator: No        Breast feeding: Not Asked        Reaction to local anesthetic: No    Social History Narrative      Not on file      Available pre-op labs reviewed.   Lab Results   Component Value Date    WBC 5.1 01/17/2019    RBC 4.77 01/17/2019

## 2019-01-19 LAB
BASOPHILS # BLD: 0 K/UL (ref 0–0.2)
BASOPHILS NFR BLD: 0 %
EOSINOPHIL # BLD: 0 K/UL (ref 0–0.7)
EOSINOPHIL NFR BLD: 0 %
ERYTHROCYTE [DISTWIDTH] IN BLOOD BY AUTOMATED COUNT: 13.8 % (ref 11–15)
HCT VFR BLD AUTO: 38 % (ref 35–48)
HGB BLD-MCNC: 12.9 G/DL (ref 12–16)
LYMPHOCYTES # BLD: 0.4 K/UL (ref 1–4)
LYMPHOCYTES NFR BLD: 4 %
MCH RBC QN AUTO: 30.3 PG (ref 27–32)
MCHC RBC AUTO-ENTMCNC: 33.9 G/DL (ref 32–37)
MCV RBC AUTO: 89.2 FL (ref 80–100)
MONOCYTES # BLD: 0.6 K/UL (ref 0–1)
MONOCYTES NFR BLD: 7 %
NEUTROPHILS # BLD AUTO: 7.8 K/UL (ref 1.8–7.7)
NEUTROPHILS NFR BLD: 89 %
PLATELET # BLD AUTO: 227 K/UL (ref 140–400)
PMV BLD AUTO: 7.9 FL (ref 7.4–10.3)
RBC # BLD AUTO: 4.27 M/UL (ref 3.7–5.4)
WBC # BLD AUTO: 8.8 K/UL (ref 4–11)

## 2019-01-19 RX ORDER — HYDROCODONE BITARTRATE AND ACETAMINOPHEN 7.5; 325 MG/1; MG/1
1 TABLET ORAL EVERY 4 HOURS PRN
Status: DISCONTINUED | OUTPATIENT
Start: 2019-01-19 | End: 2019-01-22

## 2019-01-19 RX ORDER — METOCLOPRAMIDE HYDROCHLORIDE 5 MG/ML
10 INJECTION INTRAMUSCULAR; INTRAVENOUS EVERY 6 HOURS PRN
Status: DISCONTINUED | OUTPATIENT
Start: 2019-01-19 | End: 2019-01-22

## 2019-01-19 RX ORDER — 0.9 % SODIUM CHLORIDE 0.9 %
VIAL (ML) INJECTION
Status: DISPENSED
Start: 2019-01-19 | End: 2019-01-19

## 2019-01-19 RX ORDER — ACETAMINOPHEN 325 MG/1
650 TABLET ORAL EVERY 6 HOURS PRN
Status: DISCONTINUED | OUTPATIENT
Start: 2019-01-19 | End: 2019-01-22

## 2019-01-19 NOTE — PLAN OF CARE
Patient Centered Care    • Patient preferences are identified and integrated in the patient's plan of care Progressing        Patient/Family Goals    • Patient/Family Long Term Goal Progressing          GASTROINTESTINAL - ADULT    • Minimal or absence of n

## 2019-01-19 NOTE — PROGRESS NOTES
[facility] - [facility]    OB/GYNE Progress Note      Topher Ziegler Patient Status:  Inpatient    1960 MRN [id number]   Location [facility] 4W/SW/SE Attending Terr[address] Day # 1 PCP Bria Lopez MD        Assessment/ 6):  Body mass index is 37.28 kg/m².     DVT Risk Score:   VTE Prophylaxis Ordered: yes    Oconnell Catheter Information  Does patient have a oconnell catheter: no     HRRR and lungs CTA  Abdomen- good BS, dressing dry  LE non-tender        Results:     Lab Resul

## 2019-01-20 PROCEDURE — 99253 IP/OBS CNSLTJ NEW/EST LOW 45: CPT | Performed by: INTERNAL MEDICINE

## 2019-01-20 RX ORDER — IBUPROFEN 600 MG/1
600 TABLET ORAL EVERY 6 HOURS PRN
Status: DISCONTINUED | OUTPATIENT
Start: 2019-01-20 | End: 2019-01-20

## 2019-01-20 RX ORDER — IBUPROFEN 600 MG/1
600 TABLET ORAL EVERY 6 HOURS PRN
Status: DISCONTINUED | OUTPATIENT
Start: 2019-01-20 | End: 2019-01-22

## 2019-01-20 NOTE — PROGRESS NOTES
[facility] - [facility]    OB/GYNE Progress Note      [facility] Patient Status:  Inpatient    1960 MRN [id number]   Location [facility] 4W/SW/SE Attending [address] Day # 2 PCP Reshma Velasqeuz MD        Assessment/ 01/17/2019    BUN 14 01/17/2019     01/17/2019    K 4.0 01/17/2019     01/17/2019    CO2 24 01/17/2019    GLU 94 01/17/2019    CA 9.3 01/17/2019    ALB 3.9 01/17/2019    ALKPHO 85 01/17/2019    BILT 0.9 01/17/2019    TP 7.0 01/17/2019    AST 21

## 2019-01-20 NOTE — OPERATIVE REPORT
[facility]    PATIENT'S NAME: [facility]   ATTENDING PHYSICIAN: Yonis Avery DO   OPERATING PHYSICIAN: Sydni Avery DO   PATIENT ACCOUNT#:   [de-identified]    LOCATION:  [address] [place] [address] Rd #:   [id number]       DATE OF BIR with curved Nika clamps. The cervix was then excised. An 0 Vicryl suture was used on the cuff corners incorporating the supporting ligaments. We then closed the remainder of the cuff with interrupted sutures of 0 Vicryl.   Once this was accomplished,

## 2019-01-21 ENCOUNTER — TELEPHONE (OUTPATIENT)
Dept: HEMATOLOGY/ONCOLOGY | Facility: HOSPITAL | Age: 59
End: 2019-01-21

## 2019-01-21 RX ORDER — PANTOPRAZOLE SODIUM 40 MG/1
40 TABLET, DELAYED RELEASE ORAL
Status: DISCONTINUED | OUTPATIENT
Start: 2019-01-21 | End: 2019-01-22

## 2019-01-21 RX ORDER — HYDROCODONE BITARTRATE AND ACETAMINOPHEN 7.5; 325 MG/1; MG/1
1 TABLET ORAL EVERY 4 HOURS PRN
Qty: 30 TABLET | Refills: 0 | Status: SHIPPED | OUTPATIENT
Start: 2019-01-21 | End: 2020-10-08 | Stop reason: ALTCHOICE

## 2019-01-21 RX ORDER — IBUPROFEN 600 MG/1
600 TABLET ORAL EVERY 6 HOURS PRN
Qty: 30 TABLET | Refills: 0 | Status: SHIPPED | OUTPATIENT
Start: 2019-01-21 | End: 2021-09-30

## 2019-01-21 NOTE — CONSULTS
IP consult to Hematology Once  Consult performed by: Carlton Garcias MD  Consult ordered by: Kojo Abernathy DO  Reason for consult: Anticoaguluation recommendations.             [facility]    Report of Hematology/Oncology Consultation Given the nature of the surgery patient is at risk of VTE and given his history of potential allergy to heparin I have been consulted regarding the patient's prophylactic outpatient anticoagulation. Patient is currently receiving Arixtra for prophylaxis. Marital status: Single      Spouse name: Not on file      Number of children: Not on file      Years of education: Not on file      Highest education level: Not on file    Social Needs      Financial resource strain: Not on file      Food insecurity - Psychiatric: She has a normal mood and affect. Laboratory Data:      No results found for this or any previous visit (from the past 24 hour(s)).       Imaging:          Impression and Plan:      Aubrey Harris is a 60year old female with a past

## 2019-01-21 NOTE — TELEPHONE ENCOUNTER
Returned phone call per Dr. Dev Figueroa' request and notified that as per Dr. Jarocho Stacy note to check and see what medication [facility] [place] [facility] or Eliquis is covered best by pt's  insurance formulary.

## 2019-01-21 NOTE — PROGRESS NOTES
[facility]    OB/GYNE Progress Note      Inna Maldonado Patient Status:  Inpatient    1960 MRN [id number]   Location [facility] 4W/SW/SE Attending [address] Day # 3 PCP Shiela Conrad MD        Assessment/ and appropriate tenderness. Dressing is dry. LE w/o tenderness or signs of DVT.        Results:     Lab Results   Component Value Date    ABO A 01/17/2019    RH Positive 01/17/2019    WBC 8.8 01/19/2019    HGB 12.9 01/19/2019    HCT 38.0 01/19/2019    PLT

## 2019-01-21 NOTE — PAYOR COMM NOTE
--------------  ADMISSION REVIEW     Payor: [employer]  Subscriber #:  [id number]  Authorization Number: [id number]    Admit date: 1/18/19  Admit time: Elisa Fernandez 1       Admitting Physician: Toby Best DO  Attending Physician:  DO Kermit Fernández • Hearing impairment     R ear   • [facility] 01/01/2009   • Meningitis    • Meningitis 2009   •     • Paroxysmal atrial fibrillation ([facility] 2/16/2018   • PONV (postoperative nausea and vomiting)    • Pulmonary embolism ([facility] 01/ Genitourinary: Negative for dyspareunia, dysuria, frequency, menstrual problem and urgency. She was having RLQ pain at onset in November. Musculoskeletal: Negative for arthralgias and myalgias. Skin: Negative for rash.    Neurological: Negative for weak Skin: She is not diaphoretic.             Cervical Papanicolaou negative cytology and HPV on 01-07-19.      Results:     Lab Results   Component Value Date    WBC 5.1 01/17/2019    HGB 14.4 01/17/2019    HCT 42.2 01/17/2019     01/17/2019    CREATSER I was able to retrieve a discharge summary from her hematologist at time of PE in 2009. She was admitted for meningitis and had PICC line placed. Impression was VTE due to PICC initially.  She did have heparin induced thrombocytopenia and was switched to Ag PREOPERATIVE DIAGNOSIS:  Pelvic mass. POSTOPERATIVE DIAGNOSIS:  Bilateral ovarian complex cysts, pending pathology.   PROCEDURE:  Laparotomy with total abdominal hysterectomy, bilateral salpingo-oophorectomy, and staging procedure.       CO-SURGEONS:   Next, we continued dissection down to the level of the lateral vaginal fornices, and the vagina was crossclamped with curved Nika clamps. The cervix was then excised.   An 0 Vicryl suture was used on the cuff corners incorporating the supporting ligaments Bess Rivas MD   Physician   Hematology/Oncology   Consults   Signed   Date of Service:  1/20/2019  6:39 PM            Consult Orders   IP consult to Hematology Once [[id number]] ordered by Ba Justin DO at 01/19/19 1700          Signed        Ex Shalonda Stern is a 60year old female who presented due to an elective laparotomy with total abdominal hysterectomy and bilateral salpingo-oophorectomy with staging procedure for bilateral ovarian complex cysts. This was performed on 1/18/2019.   The pa • Paroxysmal atrial fibrillation (HCC) 2/16/2018   • PONV (postoperative nausea and vomiting)     • Pulmonary embolism ([facility] 01/01/2009     Admitted with meningitis and subsequent PE.                Past Surgical History:   Procedure Laterality Date   • Ar Smoking status: Never Smoker      Smokeless tobacco: Never Used    Substance and Sexual Activity      Alcohol use: No        Alcohol/week: 0.0 oz        Frequency: Never      Drug use: No      Sexual activity: Not on file    Other Topics      Concerns: Patient's past VTE was secondary to significant risk factors such as hospitalization as well as induced from a PICC line.   Patient with negative hypercoagulable workup.     Patient with potential heparin allergy, however likely was thrombocytopenia from he

## 2019-01-21 NOTE — CM/SW NOTE
01/21/19 1100   CM/SW Referral Data   Referral Source Physician   Reason for Referral Discharge planning   Informant Patient   Pertinent Medical Hx   Significant Past Medical/Mental Health Hx (hx: AFIB,PE,hx meningitis, obesity)   Patient Info   Patient prescription for the anticoagulant from MD prior to discharge. Pt's RN informed of above.

## 2019-01-22 VITALS
HEIGHT: 65 IN | SYSTOLIC BLOOD PRESSURE: 118 MMHG | TEMPERATURE: 98 F | DIASTOLIC BLOOD PRESSURE: 61 MMHG | WEIGHT: 224 LBS | BODY MASS INDEX: 37.32 KG/M2 | OXYGEN SATURATION: 95 % | HEART RATE: 80 BPM | RESPIRATION RATE: 16 BRPM

## 2019-01-22 PROCEDURE — 58951 RESECT OVARIAN MALIGNANCY: CPT | Performed by: OBSTETRICS & GYNECOLOGY

## 2019-01-22 RX ORDER — RANITIDINE 150 MG/1
150 TABLET ORAL EVERY 12 HOURS
Qty: 28 TABLET | Refills: 0 | Status: SHIPPED | OUTPATIENT
Start: 2019-01-22 | End: 2020-07-09 | Stop reason: ALTCHOICE

## 2019-01-22 NOTE — DISCHARGE SUMMARY
[facility] [place] [facility]  Discharge Summary    [facility] Patient Status:  Inpatient    1960 MRN [id number]   Location [facility] 4W/SW/SE Attending Terr[address] Day # 4 PCP Caitlyn Langley MD           Date of Admissio

## 2019-01-22 NOTE — PAYOR COMM NOTE
REF: [id number] APPROVED 1/18/19-1/21/19  REQUESTING ADDITIONAL DAYS      1/21/19  OB/GYNE Progress Note           Assessment/Plan     Pelvic mass in female  Pending path       Paroxysmal atrial fibrillation (HCC)          History of pulmonary embolism  Ap 01/19/2019     HCT 38.0 01/19/2019      01/19/2019     CREATSERUM 0.68 01/17/2019     BUN 14 01/17/2019      01/17/2019     K 4.0 01/17/2019      01/17/2019     CO2 24 01/17/2019     GLU 94 01/17/2019     CA 9.3 01/17/2019     ALB 3.9 01

## 2019-01-22 NOTE — PROGRESS NOTES
[facility]    OB/GYNE Progress Note      [place] Patient Status:  Inpatient    1960 MRN [id number]   Location [facility] 4W/SW/SE Attending [address] Day # 4 PCP Maria Victoria Stephens MD        Assessment/ 01/17/2019    WBC 8.8 01/19/2019    HGB 12.9 01/19/2019    HCT 38.0 01/19/2019     01/19/2019    CREATSERUM 0.68 01/17/2019    BUN 14 01/17/2019     01/17/2019    K 4.0 01/17/2019     01/17/2019    CO2 24 01/17/2019    GLU 94 01/17/2019

## 2019-01-24 ENCOUNTER — TELEPHONE (OUTPATIENT)
Dept: OBGYN CLINIC | Facility: CLINIC | Age: 59
End: 2019-01-24

## 2019-01-24 NOTE — OPERATIVE REPORT
[facility]    PATIENT'S NAME: Cain Abbott   ATTENDING PHYSICIAN: Yonis Avery DO   OPERATING PHYSICIAN: Vidhya Allison DO   PATIENT ACCOUNT#:   [de-identified]    LOCATION:  [address] [place] [address] #:   [id number]       DATE OF BRIAN normal sterile fashion. A Davidson catheter was placed. Attention was then turned to the abdomen where a midline incision was created from the pubic symphysis to above the umbilicus.   We entered the peritoneum without difficulty, and the above findings were lymph node packet from the obturator space after identifying the obturator nerve bilaterally. We did use the Bovie cautery and hemoclips in these areas to achieve hemostasis. The ureter was out of harm's way throughout this procedure.   We then mobilized

## 2019-01-24 NOTE — TELEPHONE ENCOUNTER
Per AVA he agrees the pt is to stop the Norco, medication for the acid reflux and the motrin. Pt is to continue on the blood thinner. Pt is to take OTC Benadryl 25 mg and she is to take 2 tablets q 6 hours.   Pt may need several doses to see improvement i

## 2019-01-24 NOTE — TELEPHONE ENCOUNTER
Pt had surgery on 1/18, discharged on 1/22, stated she woke up with itching/rash on neck, chest, abd. And back.  Took benedryl this morning

## 2019-01-24 NOTE — TELEPHONE ENCOUNTER
Pt states she had JESUS/BSO on 1/18/19 with AVA.  Pt c/o itching on her arm during the night that has increased. Pt woke up this morning with a blotchy, itchy rash on her neck, chest abdomen and back.   Pt took Benadryl by mouth at 9 AM and she feels her sym

## 2019-01-25 NOTE — TELEPHONE ENCOUNTER
REVIEWED MESSAGE WITH AVA IN THE OFFICE. HE CONFIRMED PT CAN TAKE BENADRYL 50 MG Q 4 HOURS. SHE IS TO CONTINUE TOPICAL BENADRYL AND CALL BACK IF RASH IS NOT IMPROVED WITH 3 DAYS OF ONSET. PT NOTIFIED OF THESE RECS.    ADVISED TO FOLLOW PACKAGE DIRECTIONS

## 2019-01-25 NOTE — TELEPHONE ENCOUNTER
C/O STOPPED ALL MEDS EXCEPT BLOOD THINNER AND IS STILL HAVING A LOT OF ITCHING. THE RASH IS ON HER NECK, TORSO AND THIGHS. SHE IS TAKING BENADRYL 2 TABS Q 6 HOURS AND IS USING HYDROCORTISONE CREAM ALSO.   THINKS THE RASH MIGHT BE A LITTLE LIGHTER BUT SHE

## 2019-01-28 ENCOUNTER — TELEPHONE (OUTPATIENT)
Dept: OBGYN CLINIC | Facility: CLINIC | Age: 59
End: 2019-01-28

## 2019-01-28 NOTE — TELEPHONE ENCOUNTER
Attempted to inform pt. Interrupted and stated AVA left her a voice message. Pt accepted 12:20pm appt on 2/1.

## 2019-01-28 NOTE — TELEPHONE ENCOUNTER
I received message from Dr Tabitha Guerrero that pathology slides are at [facility] and she is awaiting second opinion from there. Dr Tabitha Guerrero asks that I see patient for staple removal on Friday. I left message on VM. Please contact her. I just saw patient message.  I can e

## 2019-02-01 ENCOUNTER — OFFICE VISIT (OUTPATIENT)
Dept: OBGYN CLINIC | Facility: CLINIC | Age: 59
End: 2019-02-01
Payer: COMMERCIAL

## 2019-02-01 VITALS — HEART RATE: 71 BPM | SYSTOLIC BLOOD PRESSURE: 117 MMHG | DIASTOLIC BLOOD PRESSURE: 75 MMHG

## 2019-02-01 DIAGNOSIS — Z90.710 STATUS POST HYSTERECTOMY WITH OOPHORECTOMY: Primary | ICD-10-CM

## 2019-02-01 DIAGNOSIS — Z48.02 ENCOUNTER FOR STAPLE REMOVAL: ICD-10-CM

## 2019-02-01 DIAGNOSIS — Z90.721 STATUS POST HYSTERECTOMY WITH OOPHORECTOMY: Primary | ICD-10-CM

## 2019-02-01 PROCEDURE — 99024 POSTOP FOLLOW-UP VISIT: CPT | Performed by: OBSTETRICS & GYNECOLOGY

## 2019-02-05 ENCOUNTER — TELEPHONE (OUTPATIENT)
Dept: OBGYN CLINIC | Facility: CLINIC | Age: 59
End: 2019-02-05

## 2019-02-05 NOTE — TELEPHONE ENCOUNTER
Received pathology results dated 2/1/2019 from [facility]. Placed on AVA desk for review.

## 2019-02-14 PROBLEM — D27.9: Status: ACTIVE | Noted: 2019-02-14

## 2019-02-14 NOTE — PROGRESS NOTES
POST OP VISIT: No complaints. Dr Shawn Mark asked me to do staple removal as she did not have final path second opinion back yet. 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 PE: Incision clean and dry. Staples cleansed and removed. Steri-strips applied.  Rosa Simmons

## 2019-02-27 ENCOUNTER — OFFICE VISIT (OUTPATIENT)
Dept: OBGYN CLINIC | Facility: CLINIC | Age: 59
End: 2019-02-27
Payer: COMMERCIAL

## 2019-02-27 VITALS
WEIGHT: 231 LBS | SYSTOLIC BLOOD PRESSURE: 117 MMHG | DIASTOLIC BLOOD PRESSURE: 76 MMHG | HEART RATE: 73 BPM | BODY MASS INDEX: 38 KG/M2

## 2019-02-27 DIAGNOSIS — Z98.890 POST-OPERATIVE STATE: Primary | ICD-10-CM

## 2019-02-27 PROCEDURE — 99024 POSTOP FOLLOW-UP VISIT: CPT | Performed by: OBSTETRICS & GYNECOLOGY

## 2019-03-10 NOTE — PROGRESS NOTES
POST OP visit: No complaints with respect pain, bowel or bladder. We had discussed final second opinion path from [facility] confirming benign adenofibroma. PE: Incision healed well.  Abdomen soft and NT.         EG and vagina w/o lesions and cuff healing wel

## 2019-09-05 ENCOUNTER — OFFICE VISIT (OUTPATIENT)
Dept: DERMATOLOGY CLINIC | Facility: CLINIC | Age: 59
End: 2019-09-05
Payer: COMMERCIAL

## 2019-09-05 DIAGNOSIS — D23.30 BENIGN NEOPLASM OF SKIN OF FACE: ICD-10-CM

## 2019-09-05 DIAGNOSIS — L82.1 SEBORRHEIC KERATOSES: ICD-10-CM

## 2019-09-05 DIAGNOSIS — D22.9 MULTIPLE NEVI: ICD-10-CM

## 2019-09-05 DIAGNOSIS — D23.4 BENIGN NEOPLASM OF SCALP AND SKIN OF NECK: ICD-10-CM

## 2019-09-05 DIAGNOSIS — D23.60 BENIGN NEOPLASM OF SKIN OF UPPER LIMB, INCLUDING SHOULDER, UNSPECIFIED LATERALITY: ICD-10-CM

## 2019-09-05 DIAGNOSIS — D23.5 BENIGN NEOPLASM OF SKIN OF TRUNK, EXCEPT SCROTUM: ICD-10-CM

## 2019-09-05 DIAGNOSIS — L72.0 EPIDERMAL CYST: Primary | ICD-10-CM

## 2019-09-05 DIAGNOSIS — L82.1 VERRUCOUS KERATOSIS: ICD-10-CM

## 2019-09-05 PROCEDURE — 99203 OFFICE O/P NEW LOW 30 MIN: CPT | Performed by: DERMATOLOGY

## 2019-09-05 RX ORDER — ERGOCALCIFEROL 1.25 MG/1
CAPSULE ORAL
Refills: 1 | COMMUNITY
Start: 2019-08-10 | End: 2020-10-08 | Stop reason: ALTCHOICE

## 2019-09-05 RX ORDER — CLARITHROMYCIN 500 MG/1
TABLET, COATED ORAL
Refills: 0 | COMMUNITY
Start: 2019-05-01 | End: 2020-07-09 | Stop reason: ALTCHOICE

## 2019-09-05 RX ORDER — BENZONATATE 100 MG/1
CAPSULE ORAL
Refills: 3 | COMMUNITY
Start: 2019-03-18 | End: 2020-07-09 | Stop reason: ALTCHOICE

## 2019-09-05 RX ORDER — CIPROFLOXACIN 500 MG/1
TABLET, FILM COATED ORAL
Refills: 0 | COMMUNITY
Start: 2019-08-21 | End: 2021-09-30

## 2019-09-05 RX ORDER — FLUTICASONE PROPIONATE 50 MCG
SPRAY, SUSPENSION (ML) NASAL
Refills: 0 | COMMUNITY
Start: 2019-06-03 | End: 2021-09-30

## 2019-09-05 RX ORDER — METHYLPREDNISOLONE 4 MG/1
TABLET ORAL
Refills: 0 | COMMUNITY
Start: 2019-05-01 | End: 2020-07-09 | Stop reason: ALTCHOICE

## 2019-09-15 NOTE — PROGRESS NOTES
Lu Mcdonald is a 60year old female. HPI:     CC:  Patient presents with:  Full Skin Exam: LOV 12/28/15. pt presenting today with full body skin check. Denies family and personal HX of skin cancer. Allergies:  Codeine; Heparin; Penicillins;  Ad Nasal Suspension U 2 SPRAYS PUFFS IEN ONCE D Disp:  Rfl: 0   RaNITidine HCl 150 MG Oral Tab Take 1 tablet (150 mg total) by mouth every 12 (twelve) hours.  AVAILABLE WITHOUT A PRESCRIPTION Disp: 28 tablet Rfl: 0   Rivaroxaban 20 MG Oral Tab Take 1 tablet (2 file      Number of children: Not on file      Years of education: Not on file      Highest education level: Not on file    Occupational History      Not on file    Social Needs      Financial resource strain: Not on file      Food insecurity:        Worry pills        There were no vitals filed for this visit. HPI:    Patient presents with:  Full Skin Exam: LOV 12/28/15. pt presenting today with full body skin check. Denies family and personal HX of skin cancer.     Past notes/ records and appropriate/rel and skin of neck  Benign neoplasm of skin of face  Benign neoplasm of skin of trunk, except scrotum  Benign neoplasm of skin of upper limb, including shoulder, unspecified laterality  Multiple nevi  Seborrheic keratoses  Verrucous keratosis    See details

## 2020-07-09 ENCOUNTER — OFFICE VISIT (OUTPATIENT)
Dept: CARDIOLOGY CLINIC | Facility: CLINIC | Age: 60
End: 2020-07-09
Payer: COMMERCIAL

## 2020-07-09 VITALS
BODY MASS INDEX: 39.34 KG/M2 | WEIGHT: 236.13 LBS | SYSTOLIC BLOOD PRESSURE: 125 MMHG | DIASTOLIC BLOOD PRESSURE: 78 MMHG | HEART RATE: 91 BPM | HEIGHT: 65 IN | RESPIRATION RATE: 18 BRPM | TEMPERATURE: 98 F

## 2020-07-09 DIAGNOSIS — I48.0 PAROXYSMAL ATRIAL FIBRILLATION (HCC): Primary | ICD-10-CM

## 2020-07-09 DIAGNOSIS — E66.09 CLASS 2 OBESITY DUE TO EXCESS CALORIES WITH BODY MASS INDEX (BMI) OF 38.0 TO 38.9 IN ADULT, UNSPECIFIED WHETHER SERIOUS COMORBIDITY PRESENT: ICD-10-CM

## 2020-07-09 PROCEDURE — 99214 OFFICE O/P EST MOD 30 MIN: CPT | Performed by: INTERNAL MEDICINE

## 2020-07-09 NOTE — PATIENT INSTRUCTIONS
-Continue aspirin 81 mg daily  -If symptoms of atrial fibrillation increase or worsen, follow-up with Dr Radha Cortes

## 2020-07-09 NOTE — PROGRESS NOTES
[facility]    Cardiac Electrophysiology Progress Note  7/9/2020      HPI:   Jesus Alberto Siegel is a 60year old with paroxysmal atrial fibrillation. She has no risk factors for it other than obesity.   She had a sleep study, which was normal.  In the pa arm, Patient Position: Sitting, Cuff Size: large)   Pulse 91   Temp 97.7 °F (36.5 °C) (Tympanic)   Resp 18   Ht 5' 5\" (1.651 m)   Wt 236 lb 2 oz (107.1 kg)   LMP 12/18/2006   BMI 39.29 kg/m²   General: In no distress  HEENT: Atraumatic.   No scleral icteru associated symptoms, she will continue with this observational approach.       Zari Duke MD  Cardiology/Cardiac EP  7/9/2020

## 2020-10-08 ENCOUNTER — OFFICE VISIT (OUTPATIENT)
Dept: CARDIOLOGY CLINIC | Facility: CLINIC | Age: 60
End: 2020-10-08
Payer: COMMERCIAL

## 2020-10-08 VITALS
WEIGHT: 231.81 LBS | BODY MASS INDEX: 38.62 KG/M2 | SYSTOLIC BLOOD PRESSURE: 121 MMHG | DIASTOLIC BLOOD PRESSURE: 77 MMHG | OXYGEN SATURATION: 97 % | HEART RATE: 94 BPM | HEIGHT: 65 IN | TEMPERATURE: 97 F

## 2020-10-08 DIAGNOSIS — I48.0 PAROXYSMAL ATRIAL FIBRILLATION (HCC): Primary | ICD-10-CM

## 2020-10-08 PROCEDURE — 3008F BODY MASS INDEX DOCD: CPT | Performed by: INTERNAL MEDICINE

## 2020-10-08 PROCEDURE — 3078F DIAST BP <80 MM HG: CPT | Performed by: INTERNAL MEDICINE

## 2020-10-08 PROCEDURE — 3074F SYST BP LT 130 MM HG: CPT | Performed by: INTERNAL MEDICINE

## 2020-10-08 PROCEDURE — 99215 OFFICE O/P EST HI 40 MIN: CPT | Performed by: INTERNAL MEDICINE

## 2020-10-08 PROCEDURE — 93000 ELECTROCARDIOGRAM COMPLETE: CPT | Performed by: INTERNAL MEDICINE

## 2020-10-08 NOTE — PROGRESS NOTES
[facility]    Cardiac Electrophysiology Progress Note  10/8/2020       HPI:   Regino oNgueira is a 63year old with paroxysmal atrial fibrillation. She has no risk factors for it other than obesity.   She had a sleep study, which was normal.  She has distress  HEENT: Atraumatic. No scleral icterus. Mucous membranes are moist.  Neck: Supple, no lymphadenopathy. Lungs: Clear to auscultation bilaterally. Cardiac: RRR, nl S1/S2. JVP is 12 cmH2O. Carotids normal pulses without bruits. No edema.   Abdom fibrillation ablation (cryoballoon PVI). I described the process of bifemoral access and transseptal puncture.   I reviewed the goals, alternatives, and risks, including a 3% rate of potentially life-threatening complications not limted to bleeding, infect

## 2020-10-08 NOTE — PATIENT INSTRUCTIONS
-Stop aspirin  -Start Xarelto 20 mg daily with food  -Blood work in 3 months  -Review treatment options for AF discussed with Dr. Joselin Hernandez today.   If you would like to move forward with antiarrhythmic drugs or ablation contact his office.  -If questions or con

## 2020-11-12 ENCOUNTER — TELEPHONE (OUTPATIENT)
Dept: CARDIOLOGY CLINIC | Facility: CLINIC | Age: 60
End: 2020-11-12

## 2020-11-12 NOTE — TELEPHONE ENCOUNTER
Patient requesting 90 days refills for Xarelto. Please call. Thank you. Current Outpatient Medications   Medication Sig Dispense Refill   • Rivaroxaban (XARELTO) 20 MG Oral Tab Take 1 tablet (20 mg total) by mouth daily with food.  30 tablet 11

## 2020-11-12 NOTE — TELEPHONE ENCOUNTER
RX sent on 10/8/20 for year supply. Confirmed receipt with pharmacy, and stated patient asking for 90 days. Notified patient pharmacy will refill for her.

## 2021-04-15 ENCOUNTER — IMMUNIZATION (OUTPATIENT)
Dept: LAB | Age: 61
End: 2021-04-15
Attending: HOSPITALIST
Payer: COMMERCIAL

## 2021-04-15 DIAGNOSIS — Z23 NEED FOR VACCINATION: Primary | ICD-10-CM

## 2021-04-15 PROCEDURE — 0001A SARSCOV2 VAC 30MCG/0.3ML IM: CPT

## 2021-05-06 ENCOUNTER — IMMUNIZATION (OUTPATIENT)
Dept: LAB | Age: 61
End: 2021-05-06
Attending: HOSPITALIST
Payer: COMMERCIAL

## 2021-05-06 DIAGNOSIS — Z23 NEED FOR VACCINATION: Primary | ICD-10-CM

## 2021-05-06 PROCEDURE — 0002A SARSCOV2 VAC 30MCG/0.3ML IM: CPT

## 2021-08-25 NOTE — H&P
[facility]    PATIENT'S NAME: Ginger Montes De Oca   ATTENDING PHYSICIAN: Saida Kellogg MD   PATIENT ACCOUNT#:   [id number]    LOCATION:  [address] [place] [address] #:   [id number]       YOB: 1960  ADMISSION DATE:       02/16/ TREATMENT PLAN (Medication Management Only)        [employer]    Name and Date of Birth:  Tk Sanders 5 y o  2011  Date of Treatment Plan: August 25, 2021  Diagnosis/Diagnoses:    1  Attention deficit hyperactivity disorder (ADHD), combined type, moderate    2  Oppositional defiant disorder    3  Depression, unspecified depression type    4  Anxiety      Strengths/Personal Resources for Self-Care: supportive family, taking medications as prescribed, ability to communicate needs, ability to listen, ability to reason  Area/Areas of need (in own words): anxiety symptoms, ADHD symptoms  1  Long Term Goal: improve anxiety, improve ADHD symptoms  Target Date: 1 year - 8/25/2022  Person/Persons responsible for completion of goal: SHIRA Munroe   2   Short Term Objective (s) - How will we reach this goal?:   A  Provider new recommended medication/dosage changes and/or continue medication(s): continue current medications as prescribed  B   Continue individual psychotherapy  Target Date: 3 months - 11/25/2021  Person/Persons Responsible for Completion of Goal: SHIRA Munroe  Progress Towards Goals: continuing treatment  Treatment Modality: medication management every 3 months  Review due 6 months from date of this plan: 6 months - 2/25/2022  Expected length of service: maintenance unless revised  My Physician/PA/NP and I have developed this plan together and I agree to work on the goals and objectives  I understand the treatment goals that were developed for my treatment      Treatment Plan done but not signed at time of office visit due to:  Plan reviewed by phone or in person  and verbal consent given due to [place] social distancing possible urinary tract infection. No diarrhea. No constipation. Other systems reviewed and negative except as above. PHYSICAL EXAMINATION:    GENERAL:  Patient is very pleasant, cooperative, conversant, feels better, hopeful to go home tomorrow.   V Awaiting for urine cultures. 4.   Prior history of pulmonary embolus. 5.   Prior history of meningitis. 6.   Prior history of severe allergic reaction to heparin.   Following hypercoagulable workup was negative, according to patient, she completed 6 mon

## 2021-08-30 ENCOUNTER — OFFICE VISIT (OUTPATIENT)
Dept: DERMATOLOGY CLINIC | Facility: CLINIC | Age: 61
End: 2021-08-30
Payer: COMMERCIAL

## 2021-08-30 VITALS — HEIGHT: 65 IN | WEIGHT: 217 LBS | BODY MASS INDEX: 36.15 KG/M2

## 2021-08-30 DIAGNOSIS — D48.5 NEOPLASM OF UNCERTAIN BEHAVIOR OF SKIN: Primary | ICD-10-CM

## 2021-08-30 PROCEDURE — 99213 OFFICE O/P EST LOW 20 MIN: CPT | Performed by: DERMATOLOGY

## 2021-08-30 PROCEDURE — 3008F BODY MASS INDEX DOCD: CPT | Performed by: DERMATOLOGY

## 2021-08-30 PROCEDURE — 88305 TISSUE EXAM BY PATHOLOGIST: CPT | Performed by: DERMATOLOGY

## 2021-08-30 PROCEDURE — 11102 TANGNTL BX SKIN SINGLE LES: CPT | Performed by: DERMATOLOGY

## 2021-09-07 ENCOUNTER — TELEPHONE (OUTPATIENT)
Dept: DERMATOLOGY CLINIC | Facility: CLINIC | Age: 61
End: 2021-09-07

## 2021-09-07 NOTE — TELEPHONE ENCOUNTER
Patient called    States day 8 after procedure. Every time she take the bandage off there is blood.  Also asking for test results

## 2021-09-07 NOTE — TELEPHONE ENCOUNTER
LOV 8/30/21 - (FYI ONLY) - Pt states she is still noticing scant blood on the bandaid when she removes it (day 8 post biopsy). Pt denies any s/s of infection but states she is surprised to see some blood still. Pt asking about her test results and was informed that nursing will contact pt once Dr. Jordan Estrada is finished reviewing results and will provide recommendations for next steps. Pt verbalized understanding.

## 2021-09-07 NOTE — PROGRESS NOTES
Logged in path book and pmh. Pt informed of lab results and KMT's recommendations and verbalized understanding. Pathology results faxed to Dr. Carey Conn. Pt provided with contact information for Dr. Carey Conn.

## 2021-09-07 NOTE — PROGRESS NOTES
The pathology report from last visit showed   SCC right posterior calf see pt's message, still some blood with changing bandage. This should have a re-excision. In this area I would suggest Dr. Magda Osorio, Plastics. Please log in test results.   Please call

## 2021-09-12 NOTE — PROGRESS NOTES
Operative Report                     Shave/  Tangential biopsy     Clinical diagnosis:    Size of lesion:    Location:  pt with crusted plaque irritated changing x several months  Spec 1 Description >>>>>: right posterior calf  Spec 1 Comment: r/o SC

## 2021-09-12 NOTE — PROGRESS NOTES
Katarina Pineda is a 57year old female. HPI:     CC:  Patient presents with:  Lesion: Pt states she has lesion on right calf. That she noticed started changing around 6 months ago. States area looks more raised and red. No pain or itching.  No hx of skin food. 30 tablet 11   • Ciprofloxacin HCl 500 MG Oral Tab TK 1 T PO  BID  0   • Fluticasone Propionate 50 MCG/ACT Nasal Suspension U 2 SPRAYS PUFFS IEN ONCE D (Patient not taking: Reported on 8/30/2021)  0   • ibuprofen 600 MG Oral Tab Take 1 tablet (600 mg Concerns:        Grew up on a farm: Not Asked        History of tanning: Not Asked        Outdoor occupation: Not Asked        Pt has a pacemaker: No        Pt has a defibrillator: No        Breast feeding: Not Asked        Reaction to local anesthetic: No Height: 65\"       HPI:    Patient presents with:  Lesion: Pt states she has lesion on right calf. That she noticed started changing around 6 months ago. States area looks more raised and red. No pain or itching.  No hx of skin cancer for self or in famil Visit:  Requested Prescriptions      No prescriptions requested or ordered in this encounter         Neoplasm of uncertain behavior of skin  (primary encounter diagnosis)    See details on map.       Remarkable for:    pt with crusted plaque irritated mackenzie recognition software. Please contact me regarding any confusion resulting from errors in recognition.

## 2021-09-30 ENCOUNTER — OFFICE VISIT (OUTPATIENT)
Dept: DERMATOLOGY CLINIC | Facility: CLINIC | Age: 61
End: 2021-09-30
Payer: COMMERCIAL

## 2021-09-30 ENCOUNTER — MED REC SCAN ONLY (OUTPATIENT)
Dept: DERMATOLOGY CLINIC | Facility: CLINIC | Age: 61
End: 2021-09-30

## 2021-09-30 DIAGNOSIS — D23.60 BENIGN NEOPLASM OF SKIN OF UPPER LIMB, INCLUDING SHOULDER, UNSPECIFIED LATERALITY: ICD-10-CM

## 2021-09-30 DIAGNOSIS — Z85.828 HISTORY OF NONMELANOMA SKIN CANCER: ICD-10-CM

## 2021-09-30 DIAGNOSIS — D23.4 BENIGN NEOPLASM OF SCALP AND SKIN OF NECK: ICD-10-CM

## 2021-09-30 DIAGNOSIS — D23.70 BENIGN NEOPLASM OF SKIN OF LOWER LIMB, INCLUDING HIP, UNSPECIFIED LATERALITY: ICD-10-CM

## 2021-09-30 DIAGNOSIS — D23.5 BENIGN NEOPLASM OF SKIN OF TRUNK, EXCEPT SCROTUM: ICD-10-CM

## 2021-09-30 DIAGNOSIS — L82.1 SEBORRHEIC KERATOSES: ICD-10-CM

## 2021-09-30 DIAGNOSIS — D22.9 MULTIPLE NEVI: Primary | ICD-10-CM

## 2021-09-30 DIAGNOSIS — D23.30 BENIGN NEOPLASM OF SKIN OF FACE: ICD-10-CM

## 2021-09-30 PROCEDURE — 99213 OFFICE O/P EST LOW 20 MIN: CPT | Performed by: DERMATOLOGY

## 2021-10-11 NOTE — PROGRESS NOTES
Topher Ziegler is a 57year old female. HPI:     CC:  Patient presents with:  Derm Problem: LOV 8/21. Patient presents for full body check. Lesion to chest and right side of nose. No changes but patient would like assessed.  Personal hx of SCC to right c Rivaroxaban (XARELTO) 20 MG Oral Tab Take 1 tablet (20 mg total) by mouth daily with food.  30 tablet 11     Allergies:     Codeine                 RASH  Heparin                 OTHER (SEE COMMENTS)    Comment:Heparin induced thrombocytopenia  Penicillins Reaction to local anesthetic: No    Social History Narrative      Not on file    Social Determinants of Health  Financial Resource Strain:       Difficulty of Paying Living Expenses: Not on file  Food Insecurity:       Worried About [address] in calf-will be excised by Dr. Rocio Paez in October. Past notes/ records and appropriate/relevant lab results including pathology and past body maps reviewed. Updated and new information noted in current visit.      Biopsy 821 SCC right posterior calf due to nonmelanoma skin cancer  Benign neoplasm of scalp and skin of neck  Benign neoplasm of skin of face  Benign neoplasm of skin of lower limb, including hip, unspecified laterality  Benign neoplasm of skin of trunk, except scrotum  Benign neoplasm of skin of medical exam :10 minutes, notes on body map, plan, counseling 10minutes My total time spent caring for the patient on the day of the encounter: 25 minutes     The patient indicates understanding of these issues and agrees to the plan.   The patient is asked

## 2021-10-25 ENCOUNTER — LAB REQUISITION (OUTPATIENT)
Dept: SURGERY | Age: 61
End: 2021-10-25
Payer: COMMERCIAL

## 2021-10-25 DIAGNOSIS — Z01.818 PREOP EXAMINATION: ICD-10-CM

## 2021-10-28 ENCOUNTER — LAB REQUISITION (OUTPATIENT)
Dept: LAB | Facility: HOSPITAL | Age: 61
End: 2021-10-28
Payer: COMMERCIAL

## 2021-10-28 DIAGNOSIS — C44.722 SQUAMOUS CELL CARCINOMA OF SKIN OF RIGHT LOWER LIMB, INCLUDING HIP: ICD-10-CM

## 2021-10-28 PROCEDURE — 88305 TISSUE EXAM BY PATHOLOGIST: CPT | Performed by: PLASTIC SURGERY

## 2021-10-28 PROCEDURE — 88332 PATH CONSLTJ SURG EA ADD BLK: CPT | Performed by: PLASTIC SURGERY

## 2021-10-28 PROCEDURE — 88331 PATH CONSLTJ SURG 1 BLK 1SPC: CPT | Performed by: PLASTIC SURGERY

## 2021-11-03 ENCOUNTER — MED REC SCAN ONLY (OUTPATIENT)
Dept: DERMATOLOGY CLINIC | Facility: CLINIC | Age: 61
End: 2021-11-03

## 2021-11-18 ENCOUNTER — IMMUNIZATION (OUTPATIENT)
Dept: LAB | Facility: HOSPITAL | Age: 61
End: 2021-11-18
Attending: EMERGENCY MEDICINE
Payer: COMMERCIAL

## 2021-11-18 DIAGNOSIS — Z23 NEED FOR VACCINATION: Primary | ICD-10-CM

## 2021-11-18 PROCEDURE — 0004A SARSCOV2 VAC 30MCG/0.3ML IM: CPT

## 2022-01-06 ENCOUNTER — OFFICE VISIT (OUTPATIENT)
Dept: OBGYN CLINIC | Facility: CLINIC | Age: 62
End: 2022-01-06
Payer: COMMERCIAL

## 2022-01-06 VITALS
DIASTOLIC BLOOD PRESSURE: 81 MMHG | SYSTOLIC BLOOD PRESSURE: 131 MMHG | BODY MASS INDEX: 37 KG/M2 | WEIGHT: 219.88 LBS | HEART RATE: 73 BPM

## 2022-01-06 DIAGNOSIS — I48.91 ATRIAL FIBRILLATION, UNSPECIFIED TYPE (HCC): ICD-10-CM

## 2022-01-06 DIAGNOSIS — Z12.31 SCREENING MAMMOGRAM FOR BREAST CANCER: ICD-10-CM

## 2022-01-06 DIAGNOSIS — Z01.419 ENCOUNTER FOR GYNECOLOGICAL EXAMINATION WITHOUT ABNORMAL FINDING: Primary | ICD-10-CM

## 2022-01-06 PROCEDURE — 3079F DIAST BP 80-89 MM HG: CPT | Performed by: OBSTETRICS & GYNECOLOGY

## 2022-01-06 PROCEDURE — 99396 PREV VISIT EST AGE 40-64: CPT | Performed by: OBSTETRICS & GYNECOLOGY

## 2022-01-06 PROCEDURE — 3075F SYST BP GE 130 - 139MM HG: CPT | Performed by: OBSTETRICS & GYNECOLOGY

## 2022-01-13 PROBLEM — I48.91 ATRIAL FIBRILLATION (HCC): Status: ACTIVE | Noted: 2018-02-16

## 2022-01-13 NOTE — PROGRESS NOTES
Subjective:   Patient ID: Luann Vargas is a 57year old female. Mother of Emily. HPI  Nulligravida and  as well as post  JESUS for  ovarian masses which turned out to be benign disease. New personal hx with DIGNA-Miguel.  Was seeing Dr Pa Winchester and now wit Thyroid: No thyromegaly. Cardiovascular:      Rate and Rhythm: Normal rate and regular rhythm. Heart sounds: Normal heart sounds. No murmur heard. Pulmonary:      Effort: Pulmonary effort is normal.      Breath sounds: Normal breath sounds.  No

## 2022-03-03 ENCOUNTER — OFFICE VISIT (OUTPATIENT)
Dept: DERMATOLOGY CLINIC | Facility: CLINIC | Age: 62
End: 2022-03-03
Payer: COMMERCIAL

## 2022-03-03 DIAGNOSIS — D23.4 BENIGN NEOPLASM OF SCALP AND SKIN OF NECK: ICD-10-CM

## 2022-03-03 DIAGNOSIS — Z85.828 HISTORY OF NONMELANOMA SKIN CANCER: ICD-10-CM

## 2022-03-03 DIAGNOSIS — L82.1 SEBORRHEIC KERATOSES: ICD-10-CM

## 2022-03-03 DIAGNOSIS — D23.30 BENIGN NEOPLASM OF SKIN OF FACE: ICD-10-CM

## 2022-03-03 DIAGNOSIS — D23.60 BENIGN NEOPLASM OF SKIN OF UPPER LIMB, INCLUDING SHOULDER, UNSPECIFIED LATERALITY: ICD-10-CM

## 2022-03-03 DIAGNOSIS — D23.5 BENIGN NEOPLASM OF SKIN OF TRUNK, EXCEPT SCROTUM: ICD-10-CM

## 2022-03-03 DIAGNOSIS — D22.9 MULTIPLE NEVI: Primary | ICD-10-CM

## 2022-03-03 DIAGNOSIS — D23.70 BENIGN NEOPLASM OF SKIN OF LOWER LIMB, INCLUDING HIP, UNSPECIFIED LATERALITY: ICD-10-CM

## 2022-03-03 PROCEDURE — 99213 OFFICE O/P EST LOW 20 MIN: CPT | Performed by: DERMATOLOGY

## 2022-09-08 ENCOUNTER — OFFICE VISIT (OUTPATIENT)
Dept: DERMATOLOGY CLINIC | Facility: CLINIC | Age: 62
End: 2022-09-08
Payer: COMMERCIAL

## 2022-09-08 DIAGNOSIS — D22.9 MULTIPLE NEVI: ICD-10-CM

## 2022-09-08 DIAGNOSIS — L82.0 INFLAMED SEBORRHEIC KERATOSIS: ICD-10-CM

## 2022-09-08 DIAGNOSIS — L72.0 EPIDERMAL CYST: Primary | ICD-10-CM

## 2022-09-08 DIAGNOSIS — Z85.828 HISTORY OF NONMELANOMA SKIN CANCER: ICD-10-CM

## 2022-09-08 DIAGNOSIS — L82.1 SEBORRHEIC KERATOSES: ICD-10-CM

## 2022-09-08 DIAGNOSIS — D23.9 BENIGN NEOPLASM OF SKIN, UNSPECIFIED LOCATION: ICD-10-CM

## 2022-09-08 DIAGNOSIS — L82.1 SK (SEBORRHEIC KERATOSIS): ICD-10-CM

## 2022-09-08 PROCEDURE — 99213 OFFICE O/P EST LOW 20 MIN: CPT | Performed by: DERMATOLOGY

## 2022-09-08 RX ORDER — BENZONATATE 100 MG/1
1 CAPSULE ORAL AS NEEDED
COMMUNITY
Start: 2022-03-17

## 2023-03-02 ENCOUNTER — OFFICE VISIT (OUTPATIENT)
Dept: DERMATOLOGY CLINIC | Facility: CLINIC | Age: 63
End: 2023-03-02

## 2023-03-02 DIAGNOSIS — D23.5 BENIGN NEOPLASM OF SKIN OF TRUNK: ICD-10-CM

## 2023-03-02 DIAGNOSIS — D23.60 BENIGN NEOPLASM OF SKIN OF UPPER LIMB, INCLUDING SHOULDER, UNSPECIFIED LATERALITY: ICD-10-CM

## 2023-03-02 DIAGNOSIS — D23.9 BENIGN NEOPLASM OF SKIN, UNSPECIFIED LOCATION: ICD-10-CM

## 2023-03-02 DIAGNOSIS — Z85.828 HISTORY OF NONMELANOMA SKIN CANCER: ICD-10-CM

## 2023-03-02 DIAGNOSIS — D23.4 BENIGN NEOPLASM OF SCALP AND SKIN OF NECK: ICD-10-CM

## 2023-03-02 DIAGNOSIS — L82.1 SEBORRHEIC KERATOSES: Primary | ICD-10-CM

## 2023-03-02 DIAGNOSIS — D22.9 MULTIPLE NEVI: ICD-10-CM

## 2023-03-02 DIAGNOSIS — D23.30 BENIGN NEOPLASM OF SKIN OF FACE: ICD-10-CM

## 2023-03-02 DIAGNOSIS — D23.70 BENIGN NEOPLASM OF SKIN OF LOWER LIMB, INCLUDING HIP, UNSPECIFIED LATERALITY: ICD-10-CM

## 2023-03-02 PROCEDURE — 99213 OFFICE O/P EST LOW 20 MIN: CPT | Performed by: DERMATOLOGY

## 2023-03-02 RX ORDER — PROPAFENONE HYDROCHLORIDE 150 MG/1
TABLET, COATED ORAL
COMMUNITY
Start: 2022-06-07

## 2023-09-15 ENCOUNTER — OFFICE VISIT (OUTPATIENT)
Dept: DERMATOLOGY CLINIC | Facility: CLINIC | Age: 63
End: 2023-09-15

## 2023-09-15 DIAGNOSIS — D23.60 BENIGN NEOPLASM OF SKIN OF UPPER LIMB, INCLUDING SHOULDER, UNSPECIFIED LATERALITY: ICD-10-CM

## 2023-09-15 DIAGNOSIS — L82.1 SK (SEBORRHEIC KERATOSIS): ICD-10-CM

## 2023-09-15 DIAGNOSIS — Z85.828 HISTORY OF NONMELANOMA SKIN CANCER: ICD-10-CM

## 2023-09-15 DIAGNOSIS — L72.0 EPIDERMAL CYST: ICD-10-CM

## 2023-09-15 DIAGNOSIS — L82.1 SEBORRHEIC KERATOSES: Primary | ICD-10-CM

## 2023-09-15 DIAGNOSIS — D23.9 BENIGN NEOPLASM OF SKIN, UNSPECIFIED LOCATION: ICD-10-CM

## 2023-09-15 DIAGNOSIS — D22.9 MULTIPLE NEVI: ICD-10-CM

## 2023-09-15 DIAGNOSIS — D23.5 BENIGN NEOPLASM OF SKIN OF TRUNK: ICD-10-CM

## 2023-09-15 DIAGNOSIS — D23.4 BENIGN NEOPLASM OF SCALP AND SKIN OF NECK: ICD-10-CM

## 2023-09-15 DIAGNOSIS — D23.30 BENIGN NEOPLASM OF SKIN OF FACE: ICD-10-CM

## 2023-09-15 DIAGNOSIS — D23.70 BENIGN NEOPLASM OF SKIN OF LOWER LIMB, INCLUDING HIP, UNSPECIFIED LATERALITY: ICD-10-CM

## 2023-09-15 PROCEDURE — 99213 OFFICE O/P EST LOW 20 MIN: CPT | Performed by: DERMATOLOGY

## 2023-09-15 RX ORDER — CIPROFLOXACIN AND DEXAMETHASONE 3; 1 MG/ML; MG/ML
SUSPENSION/ DROPS AURICULAR (OTIC)
COMMUNITY
Start: 2023-07-05

## 2023-09-25 NOTE — PROGRESS NOTES
Lily Mijares is a 58year old female. HPI:     CC:  Patient presents with:  Lesion: LOV 3/2/23. Pt hx of SCC. Pt present with lesion of concern that was noticed a month ago, spot is raised and has gotten darker. 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     SCC (squamous cell carcinoma) 09/2021    Right posterior calf      Past Surgical History:   Procedure Laterality Date    ARTHROSCOPY OF JOINT UNLISTED Right     knee for chondromalacia patella.   One open and two arthroscopic procedures    CHOLECYSTECTOMY      FRACTURE SURGERY  05/1985    left humerus, metal plate    HYSTERECTOMY  01/01/2019      Family History   Problem Relation Age of Onset    Heart Disorder Father         cardiac arrest     Cancer Father         prostate    Diabetes Mother     Other (TIA) Mother 48        multiple     Diabetes Sister         Type I    Diabetes Sister         Type II    Other (Stroke) Sister 28        r/t birth control pills       Social History     Socioeconomic History    Marital status: Single   Tobacco Use    Smoking status: Never    Smokeless tobacco: Never   Substance and Sexual Activity    Alcohol use: No     Alcohol/week: 0.0 standard drinks of alcohol    Drug use: No   Other Topics Concern    Pt has a pacemaker No    Pt has a defibrillator No    Reaction to local anesthetic No        Current Outpatient Medications   Medication Sig Dispense Refill    ciprofloxacin-dexamethasone 0.3-0.1 % Otic Suspension SHAKE LIQUID AND INSTILL 3 TO 4 DROPS TO AFFECTED EAR TWICE DAILY AS DIRECTED      Cholecalciferol 50 MCG (2000 UT) Oral Tab Take by mouth. diphenhydrAMINE-APAP, sleep, (TYLENOL PM EXTRA STRENGTH OR) Take by mouth. Rivaroxaban (XARELTO) 20 MG Oral Tab Take 1 tablet (20 mg total) by mouth daily with food. 30 tablet 11    propafenone 150 MG Oral Tab take 1 tablet by oral route  every 12 hours as needed for a-fib      benzonatate 100 MG Oral Cap Take 1 capsule by mouth as needed. (Patient not taking: Reported on 3/2/2023)       Allergies:     Codeine                 RASH  Heparin                 OTHER (SEE COMMENTS)    Comment:Heparin induced thrombocytopenia  Penicillins             ANAPHYLAXIS  Adhesive Tape (Sarah*    OTHER (SEE COMMENTS)    Comment:Skin irritation  Latex                   RASH  Zantac [Ranitidine]     RASH    Past Medical History:   Diagnosis Date    Arrhythmia 2018    Paroxysmal atrial fibrillation     Hearing impairment     R ear    Heparin induced thrombocytopenia [address] 01/01/2009    Meningitis     Meningitis 2009    Obesity due to excess calories 4/5/2018    Paroxysmal atrial fibrillation ([facility] [address] 2/16/2018    PONV (postoperative nausea and vomiting)     Pulmonary embolism ([facility] 01/01/2009    Admitted with meningitis and subsequent PE.     SCC (squamous cell carcinoma) 09/2021    Right posterior calf     Past Surgical History:   Procedure Laterality Date    ARTHROSCOPY OF JOINT UNLISTED Right     knee for chondromalacia patella.   One open and two arthroscopic procedures    CHOLECYSTECTOMY      FRACTURE SURGERY  05/1985    left humerus, metal plate    HYSTERECTOMY  01/01/2019     Social History    Socioeconomic History      Marital status: Single      Spouse name: Not on file      Number of children: Not on file      Years of education: Not on file      Highest education level: Not on file    Occupational History      Not on file    Tobacco Use      Smoking status: Never      Smokeless tobacco: Never    Substance and Sexual Activity      Alcohol use: No        Alcohol/week: 0.0 standard drinks of alcohol      Drug use: No      Sexual activity: Not on file    Other Topics      Concerns:        Grew up on a farm: Not Asked        History of tanning: Not Asked        Outdoor occupation: Not Asked        Pt has a pacemaker: No        Pt has a defibrillator: No        Breast feeding: Not Asked        Reaction to local anesthetic: No    Social History Narrative      Not on file    Social Determinants of Health  Financial Resource Strain: Not on file  Food Insecurity: Not on file  Transportation Needs: Not on file  Physical Activity: Not on file  Stress: Not on file  Social Connections: Not on file  Housing Stability: Not on file  Family History   Problem Relation Age of Onset    Heart Disorder Father         cardiac arrest     Cancer Father         prostate    Diabetes Mother     Other (TIA) Mother 48        multiple     Diabetes Sister         Type I    Diabetes Sister         Type II    Other (Stroke) Sister 28        r/t birth control pills        There were no vitals filed for this visit. HPI:    Patient presents with:  Lesion: LOV 3/2/23. Pt hx of SCC. Pt present with lesion of concern that was noticed a month ago, spot is raised and has gotten darker. Patient notes lesion darker on leg     past notes/ records and appropriate/relevant lab results including pathology and past body maps reviewed. Updated and new information noted in current visit. Follow-up history of squamous cell carcinoma concern with lesion on left hand. Biopsy 208 SCC right posterior calf status post excision with Dr. Odessa Sever      Patient presents with concerns above. No personal history of skin cancer in the past     No family history of skin cancer  Patient has been in their usual state of health. History, medications, allergies reviewed as noted. ROS:  Denies any other systemic complaints.   .10 point review of systems was completed. Pertinent positives and negatives noted in the the HPI. No new or changeing lesions other than noted above. No fevers, chills, night sweats, unusual sun sensitivity. No other skin complaints. History, medications, allergies reviewed as noted. Physical Examination:     Well-developed well-nourished patient alert oriented in no acute distress. Exam total-body performed, including scalp, head, neck, face,nails, hair, external eyes, including conjunctival mucosa, eyelids, lips external ears, back, chest,/ breasts, axillae,  abdomen, arms, legs, palms. Multiple light to medium brown, well marginated, uniformly pigmented, macules and papules 6 mm and less scattered on exam. pigmented lesions examined with dermoscopy benign-appearing patterns. Waxy tannish keratotic papules scattered, cherry-red vascular papules scattered. See map today's date for lesions noted . Otherwise remarkable for lesions as noted on map. See details of examination  See Assessment /Plan for additional history and physical exam also:    Assessment / plan:    No orders of the defined types were placed in this encounter. Meds & Refills for this Visit:  Requested Prescriptions      No prescriptions requested or ordered in this encounter         Seborrheic keratoses  (primary encounter diagnosis)  Multiple nevi  Benign neoplasm of skin, unspecified location  History of nonmelanoma skin cancer  Benign neoplasm of scalp and skin of neck  Benign neoplasm of skin of face  Benign neoplasm of skin of lower limb, including hip, unspecified laterality  Benign neoplasm of skin of upper limb, including shoulder, unspecified laterality  Benign neoplasm of skin of trunk  Epidermal cyst  Sk (seborrheic keratosis)    See details on map.       Remarkable for:    Patient concerned waxy tan papule at posterior left calf  Consistent with seborrheic keratosis on dermoscopy trial of cryo reassurance given biopsy if not resolved. Otherwise no significant changes in exam.  Continue careful monitoring no new atypical lesions. Telangiectatic patch on left dorsal hand observed. Asymptomatic. Reassurance and monitor. Larger angioma at right medial thigh observe. Fibrous papule at nasal supratip observe. Cystic lesion more centrally. Patient had noted small pustule there which seems to be subsiding. Intradermal nevus at right infraorbital cheek. Monitor carefully. SCC right posterior calf status post excision with Dr. Macrina Daniel biopsy 8/21 healing well    Erythema at left forehead observe. Stable    Cystic lesion mid back reassurance. Tan papule 1 cm at right scapula observe. Benign pattern on dermoscopy more lentiginous area medially. Monitor carefully. Semap. Fibrous papules over the nose continue monitoring. Lesions as noted. Reassurance    Tineo papule at central chest consistent benign seborrheic keratosis reassurance. Consider cryo  History of larger cyst post excision healing well    Plan follow-up approximately 6 months or as needed    Dermal papular nodule 8 mm central chest consistent with dermatofibroma has been present stable for years recommend observation. Scattered other dermal papular nodules on legs arms reassurance regarding benign dermatofibroma's. Right nasal tip with erythematous patch  Appear to be AK previously treated with cryo resolved. Slight erythema and scale noted at left alar rim observe carefully    Skin tag left temple lateral orbit observe     fibrous papule central nasal dorsum observe. Other benign-appearing nevi    Multiple benign keratoses reassurance    Multiple waxy tan keratotic papules, benign keratoses reassurance    Dermatofibroma right thigh unchanged. Nevi unchanged. Hydrocystoma lateral orbit unchanged. Papular lesions on face reassurance very benign intradermal nevi.   Continue sun protection, follow-up annually  Please refer to map for specific lesions. See additional diagnoses. Pros cons of various therapies, risks benefits discussed. Pathophysiology discussed with patient. Therapeutic options reviewed. See  Medications in grid. Instructions reviewed at length. Benign nevi, seborrheic  keratoses, cherry angiomas:  Reassurance regarding other benign skin lesions. Signs and symptoms of skin cancer, ABCDE's of melanoma discussed with patient. Sunscreen use, sun protection, self exams reviewed. Followup as noted RTC routine checkup 6 mos - one year or p.r.n. Encounter Times   Including precharting, reviewing chart, prior notes obtaining history: 10 minutes, medical exam :10 minutes, notes on body map, plan, counseling 10minutes My total time spent caring for the patient on the day of the encounter: 30 minutes       The patient indicates understanding of these issues and agrees to the plan. The patient is asked to return as noted in follow-up/ above. This note was generated using Dragon voice recognition software. Please contact me regarding any confusion resulting from errors in recognition.

## 2024-03-04 ENCOUNTER — OFFICE VISIT (OUTPATIENT)
Dept: DERMATOLOGY CLINIC | Facility: CLINIC | Age: 64
End: 2024-03-04
Payer: COMMERCIAL

## 2024-03-04 DIAGNOSIS — Z85.828 HISTORY OF NONMELANOMA SKIN CANCER: ICD-10-CM

## 2024-03-04 DIAGNOSIS — L72.0 EPIDERMAL CYST: ICD-10-CM

## 2024-03-04 DIAGNOSIS — Z08 ENCOUNTER FOR FOLLOW-UP SURVEILLANCE OF SKIN CANCER: ICD-10-CM

## 2024-03-04 DIAGNOSIS — D23.9 BENIGN NEOPLASM OF SKIN, UNSPECIFIED LOCATION: ICD-10-CM

## 2024-03-04 DIAGNOSIS — D22.9 MULTIPLE NEVI: ICD-10-CM

## 2024-03-04 DIAGNOSIS — Z85.828 ENCOUNTER FOR FOLLOW-UP SURVEILLANCE OF SKIN CANCER: ICD-10-CM

## 2024-03-04 DIAGNOSIS — L82.1 SEBORRHEIC KERATOSES: Primary | ICD-10-CM

## 2024-03-04 DIAGNOSIS — D22.39 FIBROUS PAPULE OF NOSE: ICD-10-CM

## 2024-03-04 PROCEDURE — 99213 OFFICE O/P EST LOW 20 MIN: CPT | Performed by: DERMATOLOGY

## 2024-03-17 NOTE — PROGRESS NOTES
Peyton Melendrez is a 63 year old female.  HPI:     CC:    Chief Complaint   Patient presents with    Lesion     LOV 9/15/23. Patient with personal Hx of SCC and Sk's, presents for dark, raised lesion on her L leg, behind the knee. Noticed the lesion 12/23. Denies dryness or itching. Denies pain.         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     SCC (squamous cell carcinoma) 09/2021    Right posterior calf      Past Surgical History:   Procedure Laterality Date    ARTHROSCOPY OF JOINT UNLISTED Right     knee for chondromalacia patella.  One open and two arthroscopic procedures    CHOLECYSTECTOMY      FRACTURE SURGERY  05/1985    left humerus, metal plate    HYSTERECTOMY  01/01/2019      Family History   Problem Relation Age of Onset    Heart Disorder Father         cardiac arrest     Cancer Father         prostate    Diabetes Mother     Other (TIA) Mother 50        multiple     Diabetes Sister         Type I    Diabetes Sister         Type II    Other (Stroke) Sister 32        r/t birth control pills       Social History     Socioeconomic History    Marital status: Single   Tobacco Use    Smoking status: Never    Smokeless tobacco: Never   Substance and Sexual Activity    Alcohol use: No     Alcohol/week: 0.0 standard drinks of alcohol    Drug use: No   Other Topics Concern    Pt has a pacemaker No    Pt has a defibrillator No    Reaction to local anesthetic No        Current Outpatient Medications    Medication Sig Dispense Refill    Cholecalciferol 50 MCG (2000 UT) Oral Tab Take by mouth.      diphenhydrAMINE-APAP, sleep, (TYLENOL PM EXTRA STRENGTH OR) Take by mouth.      Rivaroxaban (XARELTO) 20 MG Oral Tab Take 1 tablet (20 mg total) by mouth daily with food. 30 tablet 11    ciprofloxacin-dexamethasone 0.3-0.1 % Otic Suspension SHAKE LIQUID AND INSTILL 3 TO 4 DROPS TO AFFECTED EAR TWICE DAILY AS DIRECTED (Patient not taking: Reported on 3/4/2024)      propafenone 150 MG Oral Tab take 1 tablet by oral route  every 12 hours as needed for a-fib      benzonatate 100 MG Oral Cap Take 1 capsule by mouth as needed. 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     SCC (squamous cell carcinoma) 09/2021    Right posterior calf     Past Surgical History:   Procedure Laterality Date    ARTHROSCOPY OF JOINT UNLISTED Right     knee for chondromalacia patella.  One open and two arthroscopic procedures    CHOLECYSTECTOMY      FRACTURE SURGERY  05/1985    left humerus, metal plate    HYSTERECTOMY  01/01/2019     Social History     Socioeconomic History    Marital status: Single     Spouse name: Not on file    Number of children: Not on file    Years of education: Not on file    Highest education level: Not on  file   Occupational History    Not on file   Tobacco Use    Smoking status: Never    Smokeless tobacco: Never   Substance and Sexual Activity    Alcohol use: No     Alcohol/week: 0.0 standard drinks of alcohol    Drug use: No    Sexual activity: Not on file   Other Topics Concern    Grew up on a farm Not Asked    History of tanning Not Asked    Outdoor occupation Not Asked    Pt has a pacemaker No    Pt has a defibrillator No    Breast feeding Not Asked    Reaction to local anesthetic No   Social History Narrative    Not on file     Social Determinants of Health     Financial Resource Strain: Not on file   Food Insecurity: Not on file   Transportation Needs: Not on file   Physical Activity: Not on file   Stress: Not on file   Social Connections: Not on file   Housing Stability: Not on file     Family History   Problem Relation Age of Onset    Heart Disorder Father         cardiac arrest     Cancer Father         prostate    Diabetes Mother     Other (TIA) Mother 50        multiple     Diabetes Sister         Type I    Diabetes Sister         Type II    Other (Stroke) Sister 32        r/t birth control pills        There were no vitals filed for this visit.    HPI:    Chief Complaint   Patient presents with    Lesion     LOV 9/15/23. Patient with personal Hx of SCC and Sk's, presents for dark, raised lesion on her L leg, behind the knee. Noticed the lesion 12/23. Denies dryness or itching. Denies pain.     Follow-up  Patient notes lesion darker on leg     past notes/ records and appropriate/relevant lab results including pathology and past body maps reviewed. Updated and new information noted in current visit.     Follow-up history of squamous cell carcinoma.  For surveillance of history of skin cancer  Biopsy 821 SCC right posterior calf status post excision with Dr. Billy      Patient presents with concerns above.    No personal history of skin cancer in the past     No family history of skin cancer  Patient has  been in their usual state of health.  History, medications, allergies reviewed as noted.      ROS:  Denies any other systemic complaints.  .10 point review of systems was completed.  Pertinent positives and negatives noted in the the HPI.No new or changeing lesions other than noted above. No fevers, chills, night sweats, unusual sun sensitivity.  No other skin complaints.        History, medications, allergies reviewed as noted.       Physical Examination:     Well-developed well-nourished patient alert oriented in no acute distress.  Exam total-body performed, including scalp, head, neck, face,nails, hair, external eyes, including conjunctival mucosa, eyelids, lips external ears, back, chest,/ breasts, axillae,  abdomen, arms, legs, palms.     Multiple light to medium brown, well marginated, uniformly pigmented, macules and papules 6 mm and less scattered on exam. pigmented lesions examined with dermoscopy benign-appearing patterns.     Waxy tannish keratotic papules scattered, cherry-red vascular papules scattered.    See map today's date for lesions noted .      Otherwise remarkable for lesions as noted on map.  See details of examination  See Assessment /Plan for additional history and physical exam also:    Assessment / plan:    No orders of the defined types were placed in this encounter.      Meds & Refills for this Visit:  Requested Prescriptions      No prescriptions requested or ordered in this encounter         Encounter Diagnoses   Name Primary?    Seborrheic keratoses Yes    Multiple nevi     Benign neoplasm of skin, unspecified location     History of nonmelanoma skin cancer     Epidermal cyst     Fibrous papule of nose     Encounter for follow-up surveillance of skin cancer        See details on map.      Remarkable for:    Lesion of concern at left leg SK as noted previously new lesion popliteal fossa reassurance.  Monitor    No other significant changes in exam continue monitoring every 6  months    Telangiectatic patch on left dorsal hand observed.  Asymptomatic.  Reassurance and monitor.    Larger angioma at right medial thigh observe.    Fibrous papule at nasal supratip observe.  Cystic lesion more centrally.  Patient had noted small pustule there which seems to be subsiding.  Intradermal nevus at right infraorbital cheek.  Monitor carefully.    SCC right posterior calf status post excision with Dr. Billy biopsy 8/21 healing well    Erythema at left forehead observe.  Stable    Cystic lesion mid back reassurance.  Tan papule 1 cm at right scapula observe.  Benign pattern on dermoscopy more lentiginous area medially.  Monitor carefully.  Semap.    Fibrous papules over the nose continue monitoring.  Lesions as noted.  Reassurance    Tineo papule at central chest consistent benign seborrheic keratosis reassurance.  Consider cryo  History of larger cyst post excision healing well    Plan follow-up approximately 6 months or as needed    Dermal papular nodule 8 mm central chest consistent with dermatofibroma has been present stable for years recommend observation.  Scattered other dermal papular nodules on legs arms reassurance regarding benign dermatofibroma's.    Right nasal tip with erythematous patch  Appear to be AK previously treated with cryo resolved.    Slight erythema and scale noted at left alar rim observe carefully    Skin tag left temple lateral orbit observe     fibrous papule central nasal dorsum observe.    Other benign-appearing nevi    Multiple benign keratoses reassurance    Multiple waxy tan keratotic papules, benign keratoses reassurance    Dermatofibroma right thigh unchanged.    Nevi unchanged.    Hydrocystoma lateral orbit unchanged.  Papular lesions on face reassurance very benign intradermal nevi.  Continue sun protection, follow-up annually  Please refer to map for specific lesions.  See additional diagnoses.  Pros cons of various therapies, risks benefits  discussed.Pathophysiology discussed with patient.  Therapeutic options reviewed.  See  Medications in grid.  Instructions reviewed at length.    Benign nevi, seborrheic  keratoses, cherry angiomas:  Reassurance regarding other benign skin lesions.Signs and symptoms of skin cancer, ABCDE's of melanoma discussed with patient. Sunscreen use, sun protection, self exams reviewed.  Followup as noted RTC routine checkup 6 mos - one year or p.r.n.  Encounter Times   Including precharting, reviewing chart, prior notes obtaining history: 10 minutes, medical exam :10 minutes, notes on body map, plan, counseling 10minutes My total time spent caring for the patient on the day of the encounter: 30 minutes       The patient indicates understanding of these issues and agrees to the plan.  The patient is asked to return as noted in follow-up/ above.    This note was generated using Dragon voice recognition software.  Please contact me regarding any confusion resulting from errors in recognition.

## 2024-09-07 ENCOUNTER — OFFICE VISIT (OUTPATIENT)
Dept: DERMATOLOGY CLINIC | Facility: CLINIC | Age: 64
End: 2024-09-07
Payer: COMMERCIAL

## 2024-09-07 DIAGNOSIS — L81.4 LENTIGO: Primary | ICD-10-CM

## 2024-09-07 DIAGNOSIS — L82.1 VERRUCOUS KERATOSIS: ICD-10-CM

## 2024-09-07 DIAGNOSIS — L82.1 SK (SEBORRHEIC KERATOSIS): ICD-10-CM

## 2024-09-07 DIAGNOSIS — D23.9 BENIGN NEOPLASM OF SKIN, UNSPECIFIED LOCATION: ICD-10-CM

## 2024-09-07 DIAGNOSIS — D22.9 MULTIPLE NEVI: ICD-10-CM

## 2024-09-07 DIAGNOSIS — L82.0 INFLAMED SEBORRHEIC KERATOSIS: ICD-10-CM

## 2024-09-07 PROCEDURE — 99213 OFFICE O/P EST LOW 20 MIN: CPT | Performed by: DERMATOLOGY

## 2024-09-15 NOTE — PROGRESS NOTES
Peyton Melendrez is a 64 year old female.  HPI:     CC:    Chief Complaint   Patient presents with    Derm Problem     LOV 03/04/24. Pt presents for brown spot on left face cheek since few weeks ago. No S&S.      Pt has personal hx of SCC(rt posterior calf), SK. Pt denies family hx of Skin Ca         Allergies:  Codeine, Heparin, Penicillins, Adhesive tape (rosins), Latex, and Zantac [ranitidine]    HISTORY:    Past Medical History:    Arrhythmia    Paroxysmal atrial fibrillation     Hearing impairment    R ear    Heparin induced thrombocytopenia (HCC)    Meningitis (HCC)    Meningitis (HCC)    Obesity due to excess calories    Paroxysmal atrial fibrillation (HCC)    PONV (postoperative nausea and vomiting)    Pulmonary embolism (HCC)    Admitted with meningitis and subsequent PE.     SCC (squamous cell carcinoma)    Right posterior calf      Past Surgical History:   Procedure Laterality Date    Arthroscopy of joint unlisted Right     knee for chondromalacia patella.  One open and two arthroscopic procedures    Cholecystectomy      Fracture surgery  05/1985    left humerus, metal plate    Hysterectomy  01/01/2019      Family History   Problem Relation Age of Onset    Heart Disorder Father         cardiac arrest     Cancer Father         prostate    Diabetes Mother     Other (TIA) Mother 50        multiple     Diabetes Sister         Type I    Diabetes Sister         Type II    Other (Stroke) Sister 32        r/t birth control pills       Social History     Socioeconomic History    Marital status: Single   Tobacco Use    Smoking status: Never    Smokeless tobacco: Never   Substance and Sexual Activity    Alcohol use: No     Alcohol/week: 0.0 standard drinks of alcohol    Drug use: No   Other Topics Concern    Pt has a pacemaker No    Pt has a defibrillator No    Reaction to local anesthetic No        Current Outpatient Medications   Medication Sig Dispense Refill    diphenhydrAMINE-APAP, sleep, (TYLENOL PM EXTRA  STRENGTH OR) Take by mouth.      Rivaroxaban (XARELTO) 20 MG Oral Tab Take 1 tablet (20 mg total) by mouth daily with food. 30 tablet 11    ciprofloxacin-dexamethasone 0.3-0.1 % Otic Suspension SHAKE LIQUID AND INSTILL 3 TO 4 DROPS TO AFFECTED EAR TWICE DAILY AS DIRECTED (Patient not taking: Reported on 3/4/2024)      propafenone 150 MG Oral Tab take 1 tablet by oral route  every 12 hours as needed for a-fib      benzonatate 100 MG Oral Cap Take 1 capsule by mouth as needed. (Patient not taking: Reported on 3/2/2023)      Cholecalciferol 50 MCG (2000 UT) Oral Tab Take by mouth.       Allergies:   Allergies   Allergen Reactions    Codeine RASH    Heparin OTHER (SEE COMMENTS)     Heparin induced thrombocytopenia     Penicillins ANAPHYLAXIS    Adhesive Tape (Rosins) OTHER (SEE COMMENTS)     Skin irritation     Latex RASH    Zantac [Ranitidine] RASH       Past Medical History:    Arrhythmia    Paroxysmal atrial fibrillation     Hearing impairment    R ear    Heparin induced thrombocytopenia (HCC)    Meningitis (HCC)    Meningitis (HCC)    Obesity due to excess calories    Paroxysmal atrial fibrillation (HCC)    PONV (postoperative nausea and vomiting)    Pulmonary embolism (HCC)    Admitted with meningitis and subsequent PE.     SCC (squamous cell carcinoma)    Right posterior calf     Past Surgical History:   Procedure Laterality Date    Arthroscopy of joint unlisted Right     knee for chondromalacia patella.  One open and two arthroscopic procedures    Cholecystectomy      Fracture surgery  05/1985    left humerus, metal plate    Hysterectomy  01/01/2019     Social History     Socioeconomic History    Marital status: Single     Spouse name: Not on file    Number of children: Not on file    Years of education: Not on file    Highest education level: Not on file   Occupational History    Not on file   Tobacco Use    Smoking status: Never    Smokeless tobacco: Never   Substance and Sexual Activity    Alcohol use: No      Alcohol/week: 0.0 standard drinks of alcohol    Drug use: No    Sexual activity: Not on file   Other Topics Concern    Grew up on a farm Not Asked    History of tanning Not Asked    Outdoor occupation Not Asked    Pt has a pacemaker No    Pt has a defibrillator No    Breast feeding Not Asked    Reaction to local anesthetic No   Social History Narrative    Not on file     Social Determinants of Health     Financial Resource Strain: Not on file   Food Insecurity: Not on file   Transportation Needs: Not on file   Physical Activity: Not on file   Stress: Not on file   Social Connections: Not on file   Housing Stability: Not on file     Family History   Problem Relation Age of Onset    Heart Disorder Father         cardiac arrest     Cancer Father         prostate    Diabetes Mother     Other (TIA) Mother 50        multiple     Diabetes Sister         Type I    Diabetes Sister         Type II    Other (Stroke) Sister 32        r/t birth control pills        There were no vitals filed for this visit.    HPI:    Chief Complaint   Patient presents with    Derm Problem     LOV 03/04/24. Pt presents for brown spot on left face cheek since few weeks ago. No S&S.      Pt has personal hx of SCC(rt posterior calf), SK. Pt denies family hx of Skin Ca     Follow-up  Patient notes lesion darker on leg     past notes/ records and appropriate/relevant lab results including pathology and past body maps reviewed. Updated and new information noted in current visit.     Follow-up history of squamous cell carcinoma.  For surveillance of history of skin cancer  Biopsy 821 SCC right posterior calf status post excision with Dr. Billy      Patient presents with concerns above.    No personal history of skin cancer in the past     No family history of skin cancer  Patient has been in their usual state of health.  History, medications, allergies reviewed as noted.      ROS:  Denies any other systemic complaints.  .10 point review of systems  was completed.  Pertinent positives and negatives noted in the the HPI.No new or changeing lesions other than noted above. No fevers, chills, night sweats, unusual sun sensitivity.  No other skin complaints.        History, medications, allergies reviewed as noted.       Physical Examination:     Well-developed well-nourished patient alert oriented in no acute distress.  Exam total-body performed, including scalp, head, neck, face,nails, hair, external eyes, including conjunctival mucosa, eyelids, lips external ears, back, chest,/ breasts, axillae,  abdomen, arms, legs, palms.     Multiple light to medium brown, well marginated, uniformly pigmented, macules and papules 6 mm and less scattered on exam. pigmented lesions examined with dermoscopy benign-appearing patterns.     Waxy tannish keratotic papules scattered, cherry-red vascular papules scattered.    See map today's date for lesions noted .      Otherwise remarkable for lesions as noted on map.  See details of examination  See Assessment /Plan for additional history and physical exam also:    Assessment / plan:    No orders of the defined types were placed in this encounter.      Meds & Refills for this Visit:  Requested Prescriptions      No prescriptions requested or ordered in this encounter         Encounter Diagnoses   Name Primary?    Lentigo Yes    SK (seborrheic keratosis)     Multiple nevi     Benign neoplasm of skin, unspecified location     Verrucous keratosis     Inflamed seborrheic keratosis        See details on map.      Remarkable for:    Skin tag left lateral orbit plan removal if more irritated    Lesion of concern at left cheek more verrucous keratotic inflamed papule erythematous.  Consider trial of cryo.  Given cancer history monitor carefully    No other significant changes in exam continue monitoring every 6 months    Telangiectatic patch on left dorsal hand observed.  Asymptomatic.  Reassurance and monitor.    Larger angioma at right  medial thigh observe.    Fibrous papule at nasal supratip observe.  Cystic lesion more centrally.  Patient had noted small pustule there which seems to be subsiding.  Intradermal nevus at right infraorbital cheek.  Monitor carefully.    SCC right posterior calf status post excision with Dr. Billy biopsy 8/21 healing well    Erythema at left forehead observe.  Stable    Cystic lesion mid back reassurance.  Tan papule 1 cm at right scapula observe.  Benign pattern on dermoscopy more lentiginous area medially.  Monitor carefully.  Semap.    Fibrous papules over the nose continue monitoring.  Lesions as noted.  Reassurance    Tineo papule at central chest consistent benign seborrheic keratosis reassurance.  Consider cryo  History of larger cyst post excision healing well    Plan follow-up approximately 6 months or as needed    Dermal papular nodule 8 mm central chest consistent with dermatofibroma has been present stable for years recommend observation.  Scattered other dermal papular nodules on legs arms reassurance regarding benign dermatofibroma's.    Right nasal tip with erythematous patch  Appear to be AK previously treated with cryo resolved.    Slight erythema and scale noted at left alar rim observe carefully    Skin tag left temple lateral orbit observe     fibrous papule central nasal dorsum observe.    Other benign-appearing nevi    Multiple benign keratoses reassurance    Multiple waxy tan keratotic papules, benign keratoses reassurance    Dermatofibroma right thigh unchanged.    Nevi unchanged.    Hydrocystoma lateral orbit unchanged.  Papular lesions on face reassurance very benign intradermal nevi.  Continue sun protection, follow-up annually  Please refer to map for specific lesions.  See additional diagnoses.  Pros cons of various therapies, risks benefits discussed.Pathophysiology discussed with patient.  Therapeutic options reviewed.  See  Medications in grid.  Instructions reviewed at  length.    Benign nevi, seborrheic  keratoses, cherry angiomas:  Reassurance regarding other benign skin lesions.Signs and symptoms of skin cancer, ABCDE's of melanoma discussed with patient. Sunscreen use, sun protection, self exams reviewed.  Followup as noted RTC routine checkup 6 mos - one year or p.r.n.  Encounter Times   Including precharting, reviewing chart, prior notes obtaining history: 10 minutes, medical exam :10 minutes, notes on body map, plan, counseling 10minutes My total time spent caring for the patient on the day of the encounter: 30 minutes       The patient indicates understanding of these issues and agrees to the plan.  The patient is asked to return as noted in follow-up/ above.    This note was generated using Dragon voice recognition software.  Please contact me regarding any confusion resulting from errors in recognition.

## 2024-09-30 ENCOUNTER — OFFICE VISIT (OUTPATIENT)
Dept: DERMATOLOGY CLINIC | Facility: CLINIC | Age: 64
End: 2024-09-30
Payer: COMMERCIAL

## 2024-09-30 DIAGNOSIS — D22.39 FIBROUS PAPULE OF NOSE: ICD-10-CM

## 2024-09-30 DIAGNOSIS — Z08 ENCOUNTER FOR FOLLOW-UP SURVEILLANCE OF SKIN CANCER: ICD-10-CM

## 2024-09-30 DIAGNOSIS — Z85.828 ENCOUNTER FOR FOLLOW-UP SURVEILLANCE OF SKIN CANCER: ICD-10-CM

## 2024-09-30 DIAGNOSIS — L81.4 LENTIGO: ICD-10-CM

## 2024-09-30 DIAGNOSIS — D23.9 BENIGN NEOPLASM OF SKIN, UNSPECIFIED LOCATION: ICD-10-CM

## 2024-09-30 DIAGNOSIS — D22.9 MULTIPLE NEVI: Primary | ICD-10-CM

## 2024-09-30 DIAGNOSIS — L82.1 SEBORRHEIC KERATOSES: ICD-10-CM

## 2024-09-30 PROCEDURE — G2211 COMPLEX E/M VISIT ADD ON: HCPCS | Performed by: DERMATOLOGY

## 2024-09-30 PROCEDURE — 99214 OFFICE O/P EST MOD 30 MIN: CPT | Performed by: DERMATOLOGY

## 2024-10-12 NOTE — PROGRESS NOTES
Peyton Melendrez is a 64 year old female.  HPI:     CC:    Chief Complaint   Patient presents with    Full Skin Exam     LOV 9/07/24. Pt presents for FBSE. Has personal Hx of Sk's and SCC. Denies any concerns at this time.         Allergies:  Codeine, Heparin, Penicillins, Adhesive tape (rosins), Latex, and Zantac [ranitidine]    HISTORY:    Past Medical History:    Arrhythmia    Paroxysmal atrial fibrillation     Hearing impairment    R ear    Heparin induced thrombocytopenia (HCC)    Meningitis (HCC)    Meningitis (HCC)    Obesity due to excess calories    Paroxysmal atrial fibrillation (HCC)    PONV (postoperative nausea and vomiting)    Pulmonary embolism (HCC)    Admitted with meningitis and subsequent PE.     SCC (squamous cell carcinoma)    Right posterior calf      Past Surgical History:   Procedure Laterality Date    Arthroscopy of joint unlisted Right     knee for chondromalacia patella.  One open and two arthroscopic procedures    Cholecystectomy      Fracture surgery  05/1985    left humerus, metal plate    Hysterectomy  01/01/2019      Family History   Problem Relation Age of Onset    Heart Disorder Father         cardiac arrest     Cancer Father         prostate    Diabetes Mother     Other (TIA) Mother 50        multiple     Diabetes Sister         Type I    Diabetes Sister         Type II    Other (Stroke) Sister 32        r/t birth control pills       Social History     Socioeconomic History    Marital status: Single   Tobacco Use    Smoking status: Never    Smokeless tobacco: Never   Substance and Sexual Activity    Alcohol use: No     Alcohol/week: 0.0 standard drinks of alcohol    Drug use: No   Other Topics Concern    Pt has a pacemaker No    Pt has a defibrillator No    Reaction to local anesthetic No        Current Outpatient Medications   Medication Sig Dispense Refill    Rivaroxaban (XARELTO) 20 MG Oral Tab Take 1 tablet (20 mg total) by mouth daily with food. 30 tablet 11     ciprofloxacin-dexamethasone 0.3-0.1 % Otic Suspension SHAKE LIQUID AND INSTILL 3 TO 4 DROPS TO AFFECTED EAR TWICE DAILY AS DIRECTED (Patient not taking: Reported on 3/4/2024)      propafenone 150 MG Oral Tab take 1 tablet by oral route  every 12 hours as needed for a-fib      benzonatate 100 MG Oral Cap Take 1 capsule by mouth as needed. (Patient not taking: Reported on 3/2/2023)      Cholecalciferol 50 MCG (2000 UT) Oral Tab Take by mouth.      diphenhydrAMINE-APAP, sleep, (TYLENOL PM EXTRA STRENGTH OR) Take by mouth.       Allergies:   Allergies   Allergen Reactions    Codeine RASH    Heparin OTHER (SEE COMMENTS)     Heparin induced thrombocytopenia     Penicillins ANAPHYLAXIS    Adhesive Tape (Rosins) OTHER (SEE COMMENTS)     Skin irritation     Latex RASH    Zantac [Ranitidine] RASH       Past Medical History:    Arrhythmia    Paroxysmal atrial fibrillation     Hearing impairment    R ear    Heparin induced thrombocytopenia (HCC)    Meningitis (HCC)    Meningitis (HCC)    Obesity due to excess calories    Paroxysmal atrial fibrillation (HCC)    PONV (postoperative nausea and vomiting)    Pulmonary embolism (HCC)    Admitted with meningitis and subsequent PE.     SCC (squamous cell carcinoma)    Right posterior calf     Past Surgical History:   Procedure Laterality Date    Arthroscopy of joint unlisted Right     knee for chondromalacia patella.  One open and two arthroscopic procedures    Cholecystectomy      Fracture surgery  05/1985    left humerus, metal plate    Hysterectomy  01/01/2019     Social History     Socioeconomic History    Marital status: Single     Spouse name: Not on file    Number of children: Not on file    Years of education: Not on file    Highest education level: Not on file   Occupational History    Not on file   Tobacco Use    Smoking status: Never    Smokeless tobacco: Never   Substance and Sexual Activity    Alcohol use: No     Alcohol/week: 0.0 standard drinks of alcohol    Drug use: No     Sexual activity: Not on file   Other Topics Concern    Grew up on a farm Not Asked    History of tanning Not Asked    Outdoor occupation Not Asked    Pt has a pacemaker No    Pt has a defibrillator No    Breast feeding Not Asked    Reaction to local anesthetic No   Social History Narrative    Not on file     Social Drivers of Health     Financial Resource Strain: Not on file   Food Insecurity: Not on file   Transportation Needs: Not on file   Physical Activity: Not on file   Stress: Not on file   Social Connections: Not on file   Housing Stability: Not on file     Family History   Problem Relation Age of Onset    Heart Disorder Father         cardiac arrest     Cancer Father         prostate    Diabetes Mother     Other (TIA) Mother 50        multiple     Diabetes Sister         Type I    Diabetes Sister         Type II    Other (Stroke) Sister 32        r/t birth control pills        There were no vitals filed for this visit.    HPI:    Chief Complaint   Patient presents with    Full Skin Exam     LOV 9/07/24. Pt presents for FBSE. Has personal Hx of Sk's and SCC. Denies any concerns at this time.     Follow-up  Patient notes lesion darker on leg     past notes/ records and appropriate/relevant lab results including pathology and past body maps reviewed. Updated and new information noted in current visit.     Follow-up history of squamous cell carcinoma.  For surveillance of history of skin cancer  Biopsy 821 SCC right posterior calf status post excision with Dr. Billy      Patient presents with concerns above.    No personal history of skin cancer in the past     No family history of skin cancer  Patient has been in their usual state of health.  History, medications, allergies reviewed as noted.      ROS:  Denies any other systemic complaints.  .10 point review of systems was completed.  Pertinent positives and negatives noted in the the HPI.No new or changeing lesions other than noted above. No fevers,  chills, night sweats, unusual sun sensitivity.  No other skin complaints.        History, medications, allergies reviewed as noted.       Physical Examination:     Well-developed well-nourished patient alert oriented in no acute distress.  Exam total-body performed, including scalp, head, neck, face,nails, hair, external eyes, including conjunctival mucosa, eyelids, lips external ears, back, chest,/ breasts, axillae,  abdomen, arms, legs, palms.     Multiple light to medium brown, well marginated, uniformly pigmented, macules and papules 6 mm and less scattered on exam. pigmented lesions examined with dermoscopy benign-appearing patterns.     Waxy tannish keratotic papules scattered, cherry-red vascular papules scattered.    See map today's date for lesions noted .      Otherwise remarkable for lesions as noted on map.  See details of examination  See Assessment /Plan for additional history and physical exam also:    Assessment / plan:    No orders of the defined types were placed in this encounter.      Meds & Refills for this Visit:  Requested Prescriptions      No prescriptions requested or ordered in this encounter         Encounter Diagnoses   Name Primary?    Multiple nevi Yes    Encounter for follow-up surveillance of skin cancer     Benign neoplasm of skin, unspecified location     Seborrheic keratoses     Lentigo     Fibrous papule of nose        See details on map.      Remarkable for:    Patient seen for follow-up long-term monitoring, treatment of  History of nonmelanoma skin cancer, multiple nevi  Plan of care:  ongoing surveillance, monitoring including regular follow-up due to longer term risk of recurrence, new lesions.  See previous notes.  There is a longitudinal care relationship with me, the care plan reflects the ongoing nature of the continuous relationship of care, and the medical record indicates that there is ongoing treatment of a serious/complex medical condition which I am currently  managing.  is Applicable        Bruise at right plantar foot heel.  Monitor.  Reevaluate if area persists, more tender    5 mm macule at left plantar foot arch monitor uniform on dermoscopy    Skin tag left lateral orbit plan removal if more irritated monitor presently.    Left cheek verrucous papule improved.  Continue monitoring    No other significant changes in exam continue monitoring every 6 months    Telangiectatic patch on left dorsal hand observed.  Asymptomatic.  Reassurance and monitor.    Larger angioma at right medial thigh observe.    Fibrous papule at nasal supratip observe.  Cystic lesion more centrally. .  Intradermal nevus at right infraorbital cheek.  Monitor carefully.    SCC right posterior calf status post excision with Dr. Billy biopsy 8/21 healing well    Erythema at left forehead observe.  Stable early AK's monitor    Cystic lesion mid back reassurance.  Tan papule 1 cm at right scapula observe.  Benign pattern on dermoscopy more lentiginous area medially.  Monitor carefully.  Semap.    Fibrous papules over the nose continue monitoring.  Lesions as noted.  Reassurance    Tineo papule at central chest consistent benign seborrheic keratosis reassurance.  Consider cryo  History of larger cyst post excision healing well    Plan follow-up approximately 6 months or as needed    Dermal papular nodule 8 mm central chest consistent with dermatofibroma has been present stable for years recommend observation.  Scattered other dermal papular nodules on legs arms reassurance regarding benign dermatofibroma's.    Right nasal tip with erythematous patch  Appear to be AK previously treated with cryo resolved.    Slight erythema and scale noted at left alar rim observe carefully    Skin tag left temple lateral orbit observe     fibrous papule central nasal dorsum observe.    Other benign-appearing nevi    Multiple benign keratoses reassurance    Multiple waxy tan keratotic papules, benign keratoses  reassurance    Dermatofibroma right thigh unchanged.    Nevi unchanged.    Hydrocystoma lateral orbit unchanged.  Papular lesions on face reassurance very benign intradermal nevi.  Continue sun protection, follow-up annually  Please refer to map for specific lesions.  See additional diagnoses.  Pros cons of various therapies, risks benefits discussed.Pathophysiology discussed with patient.  Therapeutic options reviewed.  See  Medications in grid.  Instructions reviewed at length.    Benign nevi, seborrheic  keratoses, cherry angiomas:  Reassurance regarding other benign skin lesions.Signs and symptoms of skin cancer, ABCDE's of melanoma discussed with patient. Sunscreen use, sun protection, self exams reviewed.  Followup as noted RTC routine checkup 6 mos - one year or p.r.n.  Encounter Times   Including precharting, reviewing chart, prior notes obtaining history: 10 minutes, medical exam :10 minutes, notes on body map, plan, counseling 10minutes My total time spent caring for the patient on the day of the encounter: 30 minutes       The patient indicates understanding of these issues and agrees to the plan.  The patient is asked to return as noted in follow-up/ above.    This note was generated using Dragon voice recognition software.  Please contact me regarding any confusion resulting from errors in recognition.

## 2025-03-27 ENCOUNTER — OFFICE VISIT (OUTPATIENT)
Dept: DERMATOLOGY CLINIC | Facility: CLINIC | Age: 65
End: 2025-03-27
Payer: COMMERCIAL

## 2025-03-27 DIAGNOSIS — Z85.828 ENCOUNTER FOR FOLLOW-UP SURVEILLANCE OF SKIN CANCER: Primary | ICD-10-CM

## 2025-03-27 DIAGNOSIS — L81.4 LENTIGO: ICD-10-CM

## 2025-03-27 DIAGNOSIS — D22.9 MULTIPLE NEVI: ICD-10-CM

## 2025-03-27 DIAGNOSIS — L82.1 SEBORRHEIC KERATOSES: ICD-10-CM

## 2025-03-27 DIAGNOSIS — L72.0 EPIDERMAL CYST: ICD-10-CM

## 2025-03-27 DIAGNOSIS — Z08 ENCOUNTER FOR FOLLOW-UP SURVEILLANCE OF SKIN CANCER: Primary | ICD-10-CM

## 2025-03-27 DIAGNOSIS — D23.9 BENIGN NEOPLASM OF SKIN, UNSPECIFIED LOCATION: ICD-10-CM

## 2025-03-27 DIAGNOSIS — D22.39 FIBROUS PAPULE OF NOSE: ICD-10-CM

## 2025-03-27 PROCEDURE — 99214 OFFICE O/P EST MOD 30 MIN: CPT | Performed by: DERMATOLOGY

## 2025-03-31 NOTE — PROGRESS NOTES
Peyton Melendrez is a 64 year old female.  HPI:     CC:    Chief Complaint   Patient presents with    Full Skin Exam     Hx of SCC.  LOV 9/2024.  Pt presents for FBSE.  Lesion of concern to the left cheek, right upper  thigh.  Denies bleeding or pain.          Allergies:  Codeine, Heparin, Penicillins, Adhesive tape (rosins), Adhesive tape, Clindamycin hcl, Latex, and Zantac [ranitidine]    HISTORY:    Past Medical History:    Arrhythmia    Paroxysmal atrial fibrillation     Hearing impairment    R ear    Heparin induced thrombocytopenia (HCC)    Meningitis (HCC)    Meningitis (HCC)    Obesity due to excess calories    Paroxysmal atrial fibrillation (HCC)    PONV (postoperative nausea and vomiting)    Pulmonary embolism (HCC)    Admitted with meningitis and subsequent PE.     SCC (squamous cell carcinoma)    Right posterior calf      Past Surgical History:   Procedure Laterality Date    Arthroscopy of joint unlisted Right     knee for chondromalacia patella.  One open and two arthroscopic procedures    Cholecystectomy      Fracture surgery  05/1985    left humerus, metal plate    Hysterectomy  01/01/2019      Family History   Problem Relation Age of Onset    Heart Disorder Father         cardiac arrest     Cancer Father         prostate    Diabetes Mother     Other (TIA) Mother 50        multiple     Diabetes Sister         Type I    Diabetes Sister         Type II    Other (Stroke) Sister 32        r/t birth control pills       Social History     Socioeconomic History    Marital status: Single   Tobacco Use    Smoking status: Never    Smokeless tobacco: Never   Substance and Sexual Activity    Alcohol use: No     Alcohol/week: 0.0 standard drinks of alcohol    Drug use: No   Other Topics Concern    Grew up on a farm No    History of tanning Yes    Outdoor occupation No    Pt has a pacemaker No    Pt has a defibrillator No    Breast feeding No    Reaction to local anesthetic No        Current Outpatient Medications    Medication Sig Dispense Refill    Rivaroxaban (XARELTO) 20 MG Oral Tab Take 1 tablet (20 mg total) by mouth daily with food. 30 tablet 11    Albuterol Sulfate 108 (90 Base) MCG/ACT Inhalation Aerosol Powder, Breath Activated 2 puffs. (Patient not taking: Reported on 3/27/2025)      ciprofloxacin-dexamethasone 0.3-0.1 % Otic Suspension SHAKE LIQUID AND INSTILL 3 TO 4 DROPS TO AFFECTED EAR TWICE DAILY AS DIRECTED (Patient not taking: Reported on 3/27/2025)      propafenone 150 MG Oral Tab take 1 tablet by oral route  every 12 hours as needed for a-fib      benzonatate 100 MG Oral Cap Take 1 capsule by mouth as needed. (Patient not taking: Reported on 3/27/2025)      Cholecalciferol 50 MCG (2000 UT) Oral Tab Take by mouth.      diphenhydrAMINE-APAP, sleep, (TYLENOL PM EXTRA STRENGTH OR) Take by mouth.       Allergies:   Allergies   Allergen Reactions    Codeine RASH    Heparin OTHER (SEE COMMENTS)     Heparin induced thrombocytopenia     Penicillins ANAPHYLAXIS    Adhesive Tape (Rosins) OTHER (SEE COMMENTS)     Skin irritation     Adhesive Tape UNKNOWN    Clindamycin Hcl DIARRHEA, ITCHING and RASH    Latex RASH    Zantac [Ranitidine] RASH       Past Medical History:    Arrhythmia    Paroxysmal atrial fibrillation     Hearing impairment    R ear    Heparin induced thrombocytopenia (HCC)    Meningitis (HCC)    Meningitis (HCC)    Obesity due to excess calories    Paroxysmal atrial fibrillation (HCC)    PONV (postoperative nausea and vomiting)    Pulmonary embolism (HCC)    Admitted with meningitis and subsequent PE.     SCC (squamous cell carcinoma)    Right posterior calf     Past Surgical History:   Procedure Laterality Date    Arthroscopy of joint unlisted Right     knee for chondromalacia patella.  One open and two arthroscopic procedures    Cholecystectomy      Fracture surgery  05/1985    left humerus, metal plate    Hysterectomy  01/01/2019     Social History     Socioeconomic History    Marital status: Single      Spouse name: Not on file    Number of children: Not on file    Years of education: Not on file    Highest education level: Not on file   Occupational History    Not on file   Tobacco Use    Smoking status: Never    Smokeless tobacco: Never   Substance and Sexual Activity    Alcohol use: No     Alcohol/week: 0.0 standard drinks of alcohol    Drug use: No    Sexual activity: Not on file   Other Topics Concern    Grew up on a farm No    History of tanning Yes    Outdoor occupation No    Pt has a pacemaker No    Pt has a defibrillator No    Breast feeding No    Reaction to local anesthetic No   Social History Narrative    Not on file     Social Drivers of Health     Food Insecurity: Not on file   Transportation Needs: Not on file   Stress: Not on file   Housing Stability: Not on file     Family History   Problem Relation Age of Onset    Heart Disorder Father         cardiac arrest     Cancer Father         prostate    Diabetes Mother     Other (TIA) Mother 50        multiple     Diabetes Sister         Type I    Diabetes Sister         Type II    Other (Stroke) Sister 32        r/t birth control pills        There were no vitals filed for this visit.    HPI:    Chief Complaint   Patient presents with    Full Skin Exam     Hx of SCC.  LOV 9/2024.  Pt presents for FBSE.  Lesion of concern to the left cheek, right upper  thigh.  Denies bleeding or pain.      Follow-up  Patient notes lesion darker on leg     past notes/ records and appropriate/relevant lab results including pathology and past body maps reviewed. Updated and new information noted in current visit.     Follow-up history of squamous cell carcinoma.  For surveillance of history of skin cancer  Biopsy 821 SCC right posterior calf status post excision with Dr. Billy    History of Present Illness  Peyton Melendrez is a 64 year old female who presents with concern about lesions on her cheek and other areas.    She has an enlarging lesion at the left lateral  canthus, which has become increasingly irritated. She is considering scheduling a follow-up if the irritation worsens.    She is concerned about discoloration on the left cheek. There are no suspicious lesions in this area.    She has noted a touch of new versicolor at the right lateral brow line. She is using over-the-counter Lotrimin twice daily for this condition.    She has numerous benign keratoses over the trunk, with no new suspicious lesions noted. There is a history of a prior seborrheic keratosis at the ankle which has resolved, and no recurrence of prior squamous cell carcinoma.    An area on the left posterior leg had been bleeding, possibly due to an irritated seborrheic keratosis.    Larger cherry angiomas have been noted, but no further details were provided.      Patient presents with concerns above.    No personal history of skin cancer in the past     No family history of skin cancer  Patient has been in their usual state of health.  History, medications, allergies reviewed as noted.      ROS:  Denies any other systemic complaints.  .10 point review of systems was completed.  Pertinent positives and negatives noted in the the HPI.No new or changeing lesions other than noted above. No fevers, chills, night sweats, unusual sun sensitivity.  No other skin complaints.        History, medications, allergies reviewed as noted.       Physical Examination:     Well-developed well-nourished patient alert oriented in no acute distress.  Exam total-body performed, including scalp, head, neck, face,nails, hair, external eyes, including conjunctival mucosa, eyelids, lips external ears, back, chest,/ breasts, axillae,  abdomen, arms, legs, palms.     Multiple light to medium brown, well marginated, uniformly pigmented, macules and papules 6 mm and less scattered on exam. pigmented lesions examined with dermoscopy benign-appearing patterns.     Waxy tannish keratotic papules scattered, cherry-red vascular papules  scattered.    See map today's date for lesions noted .      Otherwise remarkable for lesions as noted on map.  See details of examination  See Assessment /Plan for additional history and physical exam also:    Assessment / plan:    No orders of the defined types were placed in this encounter.      Meds & Refills for this Visit:  Requested Prescriptions      No prescriptions requested or ordered in this encounter         Encounter Diagnoses   Name Primary?    Encounter for follow-up surveillance of skin cancer Yes    Multiple nevi     Benign neoplasm of skin, unspecified location     Seborrheic keratoses     Lentigo     Fibrous papule of nose     Epidermal cyst        See details on map.      Remarkable for:    Patient seen for follow-up long-term monitoring, treatment of  History of nonmelanoma skin cancer, multiple nevi  Plan of care:  ongoing surveillance, monitoring including regular follow-up due to longer term risk of recurrence, new lesions.  See previous notes.  There is a longitudinal care relationship with me, the care plan reflects the ongoing nature of the continuous relationship of care, and the medical record indicates that there is ongoing treatment of a serious/complex medical condition which I am currently managing.  is Applicable      Physical Exam  SKIN: Versicolor at right lateral brow line. Numerous benign keratoses over the trunk. No new suspicious lesions. No recurrence of prior SCC. Larger cherry angiomas. Discoloration on left cheek with actinic damage. No suspicious lesions on left cheek.    Results        Assessment & Plan  Enlarging lesion at left lateral canthus  The lesion at the left lateral canthus is increasingly irritated and enlarging.  - Perform shave biopsy to assess the nature of the lesion  - Advise to contact if the lesion becomes more irritated to schedule sooner    Irritated seborrheic keratosis  Area on the left posterior leg had been bleeding, possibly due to an  irritated seborrheic keratosis.    Actinic damage  Discoloration on the left cheek with more actinic damage noted. No suspicious lesions in this area.  - Advise continued use of sunscreen    Tinea versicolor  Tinea versicolor noted at the right lateral brow line, a common fungal infection of the skin.  - Recommend over-the-counter Lotrimin twice daily    Benign keratoses  Numerous benign keratoses noted over the trunk. Reassurance provided as these are non-cancerous growths.    Cherry angiomas  Larger cherry angiomas noted, common benign vascular lesions.    Follow-up  She has many work engagements and trips planned, so follow-up will be scheduled accordingly.  - Schedule follow-up appointment based on her availability          Bruise at right plantar foot heel.  Monitor.  Reevaluate if area persists, more tender    5 mm macule at left plantar foot arch monitor uniform on dermoscopy    Skin tag left lateral orbit plan removal if more irritated monitor presently.    Left cheek verrucous papule improved.  Continue monitoring    No other significant changes in exam continue monitoring every 6 months    Telangiectatic patch on left dorsal hand observed.  Asymptomatic.  Reassurance and monitor.    Larger angioma at right medial thigh observe.    Fibrous papule at nasal supratip observe.  Cystic lesion more centrally. .  Intradermal nevus at right infraorbital cheek.  Monitor carefully.    SCC right posterior calf status post excision with Dr. Billy biopsy 8/21 healing well    Erythema at left forehead observe.  Stable early AK's monitor    Cystic lesion mid back reassurance.  Tan papule 1 cm at right scapula observe.  Benign pattern on dermoscopy more lentiginous area medially.  Monitor carefully.  Semap.    Fibrous papules over the nose continue monitoring.  Lesions as noted.  Reassurance    Tineo papule at central chest consistent benign seborrheic keratosis reassurance.  Consider cryo  History of larger cyst post  excision healing well    Plan follow-up approximately 6 months or as needed    Dermal papular nodule 8 mm central chest consistent with dermatofibroma has been present stable for years recommend observation.  Scattered other dermal papular nodules on legs arms reassurance regarding benign dermatofibroma's.    Right nasal tip with erythematous patch  Appear to be AK previously treated with cryo resolved.    Slight erythema and scale noted at left alar rim observe carefully    Skin tag left temple lateral orbit observe     fibrous papule central nasal dorsum observe.    Other benign-appearing nevi    Multiple benign keratoses reassurance    Multiple waxy tan keratotic papules, benign keratoses reassurance    Dermatofibroma right thigh unchanged.    Nevi unchanged.    Hydrocystoma lateral orbit unchanged.  Papular lesions on face reassurance very benign intradermal nevi.  Continue sun protection, follow-up annually  Please refer to map for specific lesions.  See additional diagnoses.  Pros cons of various therapies, risks benefits discussed.Pathophysiology discussed with patient.  Therapeutic options reviewed.  See  Medications in grid.  Instructions reviewed at length.    Benign nevi, seborrheic  keratoses, cherry angiomas:  Reassurance regarding other benign skin lesions.    Monitor for new or changing lesions. Signs and symptoms of skin cancer, ABCDE's of melanoma ( additional information available at AAD.org, skincancer.org) Encourage Sunscreen (broad-spectrum, ideally mineral-based-UVA/UVB -SPF 30 or higher) use encouraged, sun protection/sun protective clothing, self exams reviewed Followup as noted RTC ---routine checkup 6 mos -one year or p.r.n.    Encounter Times   Including precharting, reviewing chart, prior notes obtaining history: 10 minutes, medical exam :10 minutes, notes on body map, plan, counseling 10minutes My total time spent caring for the patient on the day of the encounter: 30 minutes   Abridge  tool was used for dictation purposes only and the patient was not recorded at any point during the visit.   The patient indicates understanding of these issues and agrees to the plan.  The patient is asked to return as noted in follow-up/ above.    This note was generated using Dragon voice recognition software.  Please contact me regarding any confusion resulting from errors in recognition..  Note to patient and family: The 21st Century Cures Act makes medical notes like these available to patients. However, be advised this is a medical document. It is intended as [id number] communication and monitoring of a patient's care needs. It is written in medical language and may contain abbreviations or verbiage that are unfamiliar. It may appear blunt or direct. Medical documents are intended to carry relevant information, facts as evident and the clinical opinion of the practitioner.

## (undated) DEVICE — SOL  .9 1000ML BTL

## (undated) DEVICE — ENCORE® LATEX MICRO SIZE 8, STERILE LATEX POWDER-FREE SURGICAL GLOVE: Brand: ENCORE

## (undated) DEVICE — ENCORE® LATEX ACCLAIM SIZE 8, STERILE LATEX POWDER-FREE SURGICAL GLOVE: Brand: ENCORE

## (undated) DEVICE — LAPAROTOMY: Brand: MEDLINE INDUSTRIES, INC.

## (undated) DEVICE — SUTURE PDS II 0 CT-1

## (undated) DEVICE — FLOSEAL HEMOSTATIC MATRIX, 5ML: Brand: FLOSEAL HEMOSTATIC MATRIX

## (undated) DEVICE — SUTURE PDS II 0 CTX

## (undated) DEVICE — SUTURE VICRYL 0 CT-1

## (undated) DEVICE — TRAY SKIN PREP PVP-1

## (undated) DEVICE — DRAPE SHEET TRANSVERSE LAP

## (undated) DEVICE — APPLIER LICACLIP MCA MED 23.8

## (undated) DEVICE — INSULATED BLADE ELECTRODE 6.5

## (undated) DEVICE — SOL H2O 1000ML BTL

## (undated) DEVICE — SUTURE VICRYL 3-0 SH

## (undated) DEVICE — SUTURE VICRYL 0 J906G

## (undated) DEVICE — UNIT THERA PREVENA 45ML

## (undated) DEVICE — LIGASURE IMPACT OPEN DEVICE

## (undated) DEVICE — SUTURE VICRYL 3-0 J442H

## (undated) NOTE — LETTER
[address], [place], [place]     AUTHORIZATION FOR SURGICAL OPERATION OR PROCEDURE    I hereby authorize Dr. Lenoard Leyden, DO, Dr. Marybeth Miller., DO my Physician(s) and whomever may be designated as the doctor's own blood, or a directed donor transfusion, I will discuss this with my Physician. 4. I consent to the photographing of procedure(s) to be performed for the purposes of advancing medicine, science and/or education, provided my identity is not revealed.  If _______________________________________________________________ ____________________________  (Witness signature)                                                                                                  (Date)                                (Time)

## (undated) NOTE — LETTER
[facility]  Administration of Anesthesia  1. I, Joselin Render, or _________________________________ acting on her behalf, (Patient) (Dependent/Representative) request to receive anesthesia for my pending procedure/operation/treatment.   A 6. OBSTETRIC PATIENTS: Specific risks/consequences of spinal/epidural anesthesia may include itching, low blood pressure, difficulty urinating, slowing of the baby's heart rate and headache.  Rare risks include infections, high spinal block, spinal bleeding ___________________________________________________           _____________________________________________________  Date/Time                                                                                               Responsible person in case of minor

## (undated) NOTE — LETTER
INSTRUCTIONS FOR PRE-SURGICAL   ANTIMICROBIAL BATH/SHOWER    Your doctor has recommended a pre-surgical CHG (chlorhexidine gluconate) shower/bath with Betasept (also sold as Hibiclens). It reduces bacteria that can potentially cause infection.   Betasept Keep out of reach of children. If swallowed get medica help or contact [employer]. Store between 60-80 degrees F. [employer]ric Warning! CHG WILL STAIN YOUR FABRICS! Use with care around shower curtains, towels washcloths rugs and clothes.   Wipe

## (undated) NOTE — ED AVS SNAPSHOT
Antwan Griggs   MRN: [id number]    Department:  [facility] Emergency Department   Date of Visit:  3/8/2018           Disclosure     Insurance plans vary and the physician(s) referred by the ER may not be covered by your plan.  Please contact yo CARE PHYSICIAN AT ONCE OR RETURN IMMEDIATELY TO THE EMERGENCY DEPARTMENT. If you have been prescribed any medication(s), please fill your prescription right away and begin taking the medication(s) as directed.   If you believe that any of the medications

## (undated) NOTE — LETTER
MAJOR CASE PREOPERATIVE INSTRUCTIONS  1/11/2019    Dear Shawna Guerrero,    Your are having a Laparotomy with total abdominal hysterectomy bilateral salpingo oophorectomy and possible staging on 1/18/19 at 1pm.    Do not eat or drink anything (including water) af [address] [place] [address], [address]  [phone]    Document electronically generated by:  Marilu Licea

## (undated) NOTE — LETTER
[facility] [address], [place], [place]  [address]  INFORMED CONSENT FOR TRANSFUSION OF BLOOD OR BLOOD PRODUCTS  My physician has informed me of the nature, purpose, benefits and risks of transfusion for blood and blood components that ______________________________________________  (Signature of Patient)                                                            (Responsible party in case of Minor,

## (undated) NOTE — LETTER
[address] [facility]  [place] [id number]  [phone]    10/08/20          Maria Victoria Stephens MD  [address] [place] [address]          Patient: Lam Gong   YOB: 1960   D